# Patient Record
Sex: MALE | Race: WHITE | NOT HISPANIC OR LATINO | URBAN - METROPOLITAN AREA
[De-identification: names, ages, dates, MRNs, and addresses within clinical notes are randomized per-mention and may not be internally consistent; named-entity substitution may affect disease eponyms.]

---

## 2018-05-27 ENCOUNTER — INPATIENT (INPATIENT)
Facility: HOSPITAL | Age: 83
LOS: 1 days | Discharge: HOME | DRG: 305 | End: 2018-05-29
Attending: INTERNAL MEDICINE | Admitting: STUDENT IN AN ORGANIZED HEALTH CARE EDUCATION/TRAINING PROGRAM
Payer: MEDICARE

## 2018-05-27 VITALS
SYSTOLIC BLOOD PRESSURE: 174 MMHG | TEMPERATURE: 98 F | DIASTOLIC BLOOD PRESSURE: 76 MMHG | HEART RATE: 56 BPM | RESPIRATION RATE: 18 BRPM

## 2018-05-27 DIAGNOSIS — N40.1 BENIGN PROSTATIC HYPERPLASIA WITH LOWER URINARY TRACT SYMPTOMS: ICD-10-CM

## 2018-05-27 DIAGNOSIS — I10 ESSENTIAL (PRIMARY) HYPERTENSION: ICD-10-CM

## 2018-05-27 DIAGNOSIS — Z29.9 ENCOUNTER FOR PROPHYLACTIC MEASURES, UNSPECIFIED: ICD-10-CM

## 2018-05-27 DIAGNOSIS — E78.5 HYPERLIPIDEMIA, UNSPECIFIED: ICD-10-CM

## 2018-05-27 DIAGNOSIS — R26.81 UNSTEADINESS ON FEET: ICD-10-CM

## 2018-05-27 DIAGNOSIS — R42 DIZZINESS AND GIDDINESS: ICD-10-CM

## 2018-05-27 DIAGNOSIS — N17.9 ACUTE KIDNEY FAILURE, UNSPECIFIED: ICD-10-CM

## 2018-05-27 DIAGNOSIS — I16.0 HYPERTENSIVE URGENCY: ICD-10-CM

## 2018-05-27 LAB
ALBUMIN SERPL ELPH-MCNC: 3.8 G/DL — SIGNIFICANT CHANGE UP (ref 3.3–5)
ALP SERPL-CCNC: 111 U/L — SIGNIFICANT CHANGE UP (ref 40–120)
ALT FLD-CCNC: 30 U/L — SIGNIFICANT CHANGE UP (ref 12–78)
ANION GAP SERPL CALC-SCNC: 9 MMOL/L — SIGNIFICANT CHANGE UP (ref 5–17)
APPEARANCE UR: CLEAR — SIGNIFICANT CHANGE UP
AST SERPL-CCNC: 31 U/L — SIGNIFICANT CHANGE UP (ref 15–37)
BASOPHILS # BLD AUTO: 0.01 K/UL — SIGNIFICANT CHANGE UP (ref 0–0.2)
BASOPHILS NFR BLD AUTO: 0.2 % — SIGNIFICANT CHANGE UP (ref 0–2)
BILIRUB SERPL-MCNC: 0.9 MG/DL — SIGNIFICANT CHANGE UP (ref 0.2–1.2)
BILIRUB UR-MCNC: NEGATIVE — SIGNIFICANT CHANGE UP
BUN SERPL-MCNC: 29 MG/DL — HIGH (ref 7–23)
CALCIUM SERPL-MCNC: 8.7 MG/DL — SIGNIFICANT CHANGE UP (ref 8.5–10.1)
CHLORIDE SERPL-SCNC: 103 MMOL/L — SIGNIFICANT CHANGE UP (ref 96–108)
CO2 SERPL-SCNC: 27 MMOL/L — SIGNIFICANT CHANGE UP (ref 22–31)
COLOR SPEC: YELLOW — SIGNIFICANT CHANGE UP
CREAT SERPL-MCNC: 1.5 MG/DL — HIGH (ref 0.5–1.3)
DIFF PNL FLD: NEGATIVE — SIGNIFICANT CHANGE UP
EOSINOPHIL # BLD AUTO: 0.02 K/UL — SIGNIFICANT CHANGE UP (ref 0–0.5)
EOSINOPHIL NFR BLD AUTO: 0.3 % — SIGNIFICANT CHANGE UP (ref 0–6)
GLUCOSE SERPL-MCNC: 151 MG/DL — HIGH (ref 70–99)
GLUCOSE UR QL: NEGATIVE — SIGNIFICANT CHANGE UP
HCT VFR BLD CALC: 44.7 % — SIGNIFICANT CHANGE UP (ref 39–50)
HGB BLD-MCNC: 15.3 G/DL — SIGNIFICANT CHANGE UP (ref 13–17)
IMM GRANULOCYTES NFR BLD AUTO: 1 % — SIGNIFICANT CHANGE UP (ref 0–1.5)
KETONES UR-MCNC: NEGATIVE — SIGNIFICANT CHANGE UP
LEUKOCYTE ESTERASE UR-ACNC: NEGATIVE — SIGNIFICANT CHANGE UP
LYMPHOCYTES # BLD AUTO: 0.62 K/UL — LOW (ref 1–3.3)
LYMPHOCYTES # BLD AUTO: 10 % — LOW (ref 13–44)
MCHC RBC-ENTMCNC: 31.5 PG — SIGNIFICANT CHANGE UP (ref 27–34)
MCHC RBC-ENTMCNC: 34.2 GM/DL — SIGNIFICANT CHANGE UP (ref 32–36)
MCV RBC AUTO: 92.2 FL — SIGNIFICANT CHANGE UP (ref 80–100)
MONOCYTES # BLD AUTO: 0.41 K/UL — SIGNIFICANT CHANGE UP (ref 0–0.9)
MONOCYTES NFR BLD AUTO: 6.6 % — SIGNIFICANT CHANGE UP (ref 2–14)
NEUTROPHILS # BLD AUTO: 5.1 K/UL — SIGNIFICANT CHANGE UP (ref 1.8–7.4)
NEUTROPHILS NFR BLD AUTO: 81.9 % — HIGH (ref 43–77)
NITRITE UR-MCNC: NEGATIVE — SIGNIFICANT CHANGE UP
PH UR: 7 — SIGNIFICANT CHANGE UP (ref 5–8)
PLATELET # BLD AUTO: 160 K/UL — SIGNIFICANT CHANGE UP (ref 150–400)
POTASSIUM SERPL-MCNC: 4.6 MMOL/L — SIGNIFICANT CHANGE UP (ref 3.5–5.3)
POTASSIUM SERPL-SCNC: 4.6 MMOL/L — SIGNIFICANT CHANGE UP (ref 3.5–5.3)
PROT SERPL-MCNC: 7.8 G/DL — SIGNIFICANT CHANGE UP (ref 6–8.3)
PROT UR-MCNC: 25 MG/DL
RBC # BLD: 4.85 M/UL — SIGNIFICANT CHANGE UP (ref 4.2–5.8)
RBC # FLD: 12.6 % — SIGNIFICANT CHANGE UP (ref 10.3–14.5)
SODIUM SERPL-SCNC: 139 MMOL/L — SIGNIFICANT CHANGE UP (ref 135–145)
SP GR SPEC: 1 — LOW (ref 1.01–1.02)
TROPONIN I SERPL-MCNC: <.015 NG/ML — SIGNIFICANT CHANGE UP (ref 0.01–0.04)
UROBILINOGEN FLD QL: NEGATIVE — SIGNIFICANT CHANGE UP
WBC # BLD: 6.22 K/UL — SIGNIFICANT CHANGE UP (ref 3.8–10.5)
WBC # FLD AUTO: 6.22 K/UL — SIGNIFICANT CHANGE UP (ref 3.8–10.5)

## 2018-05-27 PROCEDURE — 71046 X-RAY EXAM CHEST 2 VIEWS: CPT | Mod: 26

## 2018-05-27 PROCEDURE — 93010 ELECTROCARDIOGRAM REPORT: CPT

## 2018-05-27 PROCEDURE — 74176 CT ABD & PELVIS W/O CONTRAST: CPT | Mod: 26

## 2018-05-27 PROCEDURE — 99285 EMERGENCY DEPT VISIT HI MDM: CPT

## 2018-05-27 PROCEDURE — 99223 1ST HOSP IP/OBS HIGH 75: CPT | Mod: AI

## 2018-05-27 PROCEDURE — 70450 CT HEAD/BRAIN W/O DYE: CPT | Mod: 26

## 2018-05-27 RX ORDER — ASPIRIN/CALCIUM CARB/MAGNESIUM 324 MG
81 TABLET ORAL DAILY
Qty: 0 | Refills: 0 | Status: DISCONTINUED | OUTPATIENT
Start: 2018-05-27 | End: 2018-05-29

## 2018-05-27 RX ORDER — HYDROCHLOROTHIAZIDE 25 MG
25 TABLET ORAL DAILY
Qty: 0 | Refills: 0 | Status: DISCONTINUED | OUTPATIENT
Start: 2018-05-27 | End: 2018-05-28

## 2018-05-27 RX ORDER — HEPARIN SODIUM 5000 [USP'U]/ML
5000 INJECTION INTRAVENOUS; SUBCUTANEOUS EVERY 8 HOURS
Qty: 0 | Refills: 0 | Status: DISCONTINUED | OUTPATIENT
Start: 2018-05-27 | End: 2018-05-29

## 2018-05-27 RX ORDER — MECLIZINE HCL 12.5 MG
25 TABLET ORAL THREE TIMES A DAY
Qty: 0 | Refills: 0 | Status: DISCONTINUED | OUTPATIENT
Start: 2018-05-27 | End: 2018-05-29

## 2018-05-27 RX ORDER — ONDANSETRON 8 MG/1
4 TABLET, FILM COATED ORAL ONCE
Qty: 0 | Refills: 0 | Status: COMPLETED | OUTPATIENT
Start: 2018-05-27 | End: 2018-05-27

## 2018-05-27 RX ORDER — ONDANSETRON 8 MG/1
4 TABLET, FILM COATED ORAL EVERY 6 HOURS
Qty: 0 | Refills: 0 | Status: DISCONTINUED | OUTPATIENT
Start: 2018-05-27 | End: 2018-05-29

## 2018-05-27 RX ORDER — ACETAMINOPHEN 500 MG
650 TABLET ORAL EVERY 6 HOURS
Qty: 0 | Refills: 0 | Status: DISCONTINUED | OUTPATIENT
Start: 2018-05-27 | End: 2018-05-29

## 2018-05-27 RX ORDER — FINASTERIDE 5 MG/1
1 TABLET, FILM COATED ORAL
Qty: 0 | Refills: 0 | COMMUNITY

## 2018-05-27 RX ORDER — ATORVASTATIN CALCIUM 80 MG/1
80 TABLET, FILM COATED ORAL AT BEDTIME
Qty: 0 | Refills: 0 | Status: DISCONTINUED | OUTPATIENT
Start: 2018-05-27 | End: 2018-05-29

## 2018-05-27 RX ORDER — MECLIZINE HCL 12.5 MG
25 TABLET ORAL ONCE
Qty: 0 | Refills: 0 | Status: COMPLETED | OUTPATIENT
Start: 2018-05-27 | End: 2018-05-27

## 2018-05-27 RX ORDER — FINASTERIDE 5 MG/1
5 TABLET, FILM COATED ORAL DAILY
Qty: 0 | Refills: 0 | Status: DISCONTINUED | OUTPATIENT
Start: 2018-05-27 | End: 2018-05-29

## 2018-05-27 RX ORDER — SODIUM CHLORIDE 9 MG/ML
1000 INJECTION INTRAMUSCULAR; INTRAVENOUS; SUBCUTANEOUS ONCE
Qty: 0 | Refills: 0 | Status: COMPLETED | OUTPATIENT
Start: 2018-05-27 | End: 2018-05-27

## 2018-05-27 RX ORDER — HYDRALAZINE HCL 50 MG
10 TABLET ORAL THREE TIMES A DAY
Qty: 0 | Refills: 0 | Status: DISCONTINUED | OUTPATIENT
Start: 2018-05-27 | End: 2018-05-29

## 2018-05-27 RX ORDER — DOXAZOSIN MESYLATE 4 MG
8 TABLET ORAL AT BEDTIME
Qty: 0 | Refills: 0 | Status: DISCONTINUED | OUTPATIENT
Start: 2018-05-27 | End: 2018-05-28

## 2018-05-27 RX ORDER — SODIUM CHLORIDE 9 MG/ML
1000 INJECTION INTRAMUSCULAR; INTRAVENOUS; SUBCUTANEOUS
Qty: 0 | Refills: 0 | Status: DISCONTINUED | OUTPATIENT
Start: 2018-05-27 | End: 2018-05-27

## 2018-05-27 RX ORDER — PANTOPRAZOLE SODIUM 20 MG/1
40 TABLET, DELAYED RELEASE ORAL
Qty: 0 | Refills: 0 | Status: DISCONTINUED | OUTPATIENT
Start: 2018-05-27 | End: 2018-05-29

## 2018-05-27 RX ORDER — HYDRALAZINE HCL 50 MG
10 TABLET ORAL ONCE
Qty: 0 | Refills: 0 | Status: COMPLETED | OUTPATIENT
Start: 2018-05-27 | End: 2018-05-27

## 2018-05-27 RX ORDER — AMLODIPINE BESYLATE 2.5 MG/1
5 TABLET ORAL DAILY
Qty: 0 | Refills: 0 | Status: DISCONTINUED | OUTPATIENT
Start: 2018-05-27 | End: 2018-05-28

## 2018-05-27 RX ORDER — MECLIZINE HCL 12.5 MG
1 TABLET ORAL
Qty: 30 | Refills: 0 | OUTPATIENT
Start: 2018-05-27

## 2018-05-27 RX ADMIN — SODIUM CHLORIDE 1000 MILLILITER(S): 9 INJECTION INTRAMUSCULAR; INTRAVENOUS; SUBCUTANEOUS at 16:00

## 2018-05-27 RX ADMIN — ONDANSETRON 4 MILLIGRAM(S): 8 TABLET, FILM COATED ORAL at 16:08

## 2018-05-27 RX ADMIN — Medication 10 MILLIGRAM(S): at 16:08

## 2018-05-27 RX ADMIN — Medication 25 MILLIGRAM(S): at 16:12

## 2018-05-27 RX ADMIN — AMLODIPINE BESYLATE 5 MILLIGRAM(S): 2.5 TABLET ORAL at 20:19

## 2018-05-27 NOTE — ED ADULT NURSE REASSESSMENT NOTE - NS ED NURSE REASSESS COMMENT FT1
patient reports feeling dizzy, nauseas with dry heaves, BP elevated, MD alonzo and primary RN Malik made aware. Will continue to monitor.

## 2018-05-27 NOTE — H&P ADULT - ATTENDING COMMENTS
The admission plan was discussed in detail with the patient and family members at the bedside. Their questions and concerns were addressed to the best of my ability. They are in agreement with the plan as detailed above. They demonstrated adequate understanding of the counseling which I have provided.      [full note to follow] The admission plan was discussed in detail with the patient and family members at the bedside. Their questions and concerns were addressed to the best of my ability. They are in agreement with the plan as detailed above. They demonstrated adequate understanding of the counseling which I have provided.

## 2018-05-27 NOTE — H&P ADULT - PROBLEM SELECTOR PLAN 3
likely vertigo as symptoms are positional and worse when standing from seated position  continue meclizine as above  check orthostatics  f/u neuro recs

## 2018-05-27 NOTE — H&P ADULT - NSHPSOCIALHISTORY_GEN_ALL_CORE
pt has at least 80 pack-year smokking history but quit 35yrs ago  no etoh or drug use  lives w/ spouse  ambulates independently at baseline

## 2018-05-27 NOTE — H&P ADULT - HISTORY OF PRESENT ILLNESS
86yo M w/ PMHx HTN, HLD, BPH, melanoma on RLE, [place] presents w/ intractable dizziness 86yo M w/ PMHx HTN, HLD, BPH, melanoma on RLE, [place] presents w/ intractable dizziness which was associated w/ elevated BP's today. The pt is [place] and offers minimal complaints upon questionng, majority if history obtained from pt's family (spouse/daughter) at the bedside. They state that they helped him check his BP's this morning and his BP was 220 systolic at home. He took his regular home meds but BP's didn't improve. They state that the color had drained from his face and he was complaining of feeling dizzy so they brought him to the ED. In the ED pt's BP's have been elevated throughout the day. He was given hydralazine and meclizine and apparently had multiple ambulation challenges but pt felt too weak, too dizzy to ambulate and family state that he has to climb 2 flights of stairs to the house so they can't take him home in his current state. Pt admits to feeling like the room is spinning but only when standing from a seated position and sitting from a lying position. He has no h/o similar complaint. Typical BP's in the office have been about 130/70 over the previous few years and he has had no adjustments to his meds. Denies HA. Denies blurred vision. Denies numbness/tinging in extremities. ADmits feeling generally weak.     In the ED, pt was given hydralazine and BP improved from 210 systolic into the 130's but BP's elevated again over the course of the afternoon. He was unable to ambulate due to dizziness.

## 2018-05-27 NOTE — H&P ADULT - PROBLEM SELECTOR PLAN 1
admit to medicine  multiple attempts to d/c the pt from the ED but pt is unable to ambulate due to persistent dizziness  hold losartan due to ? SHEREE ?   will continue w/ hctz, however  consider MR brain if symptoms do not improve. will need to eval for possible brain stem pathology. ?CVA, doubt this w/ benign neurologic examination at the time of admission. no h/o CVA.   will ask for neuro input (stephen)  continue w/ IVF tonight and meclizine tid   symptoms are improving by the time I examined him but not resolved  check orthostatics  PT consult  fall risk  ambulate w/ assistance  bed alarm  holding parameters for BP meds SBP<140

## 2018-05-27 NOTE — ED PROVIDER NOTE - OBJECTIVE STATEMENT
86 y/o M with hx of HTN, BPH with c/o weakness, lower abdominal discomfort and elevated BP today.  Pt states he had a "heated discussion" with his family about politics yesterday.  Pt woke up today feeling unsteady with lower abd discomfort.  BP was 200/100 and pt took all of his morning and night time medications.  Pt currently asymptomatic.

## 2018-05-27 NOTE — ED ADULT NURSE NOTE - CHPI ED SYMPTOMS NEG
no loss of consciousness/no numbness/no confusion/no weakness/no fever/no change in level of consciousness/no blurred vision

## 2018-05-27 NOTE — ED ADULT NURSE NOTE - OBJECTIVE STATEMENT
patient came in ED from home with c/o sudden onset of dizziness, nausea and vomiting with elevated BP X this morning. denies LOC. denies chest pain or discomfort. no facial droop noted. no arm drift. no slurred speech. afebrile. alert and oriented X 4. placed on cardiac monitor. on fall precaution. bedside blood glucose 149mg/dl taken and recorded. awaiting md evaluation.

## 2018-05-27 NOTE — H&P ADULT - NSHPPHYSICALEXAM_GEN_ALL_CORE
T(C): 36.4 (05-27-18 @ 20:11), Max: 36.7 (05-27-18 @ 13:40)  HR: 80 (05-27-18 @ 20:11) (56 - 81)  BP: 184/63 (05-27-18 @ 20:11) (166/74 - 211/70)  RR: 16 (05-27-18 @ 20:11) (16 - 20)  SpO2: 95% (05-27-18 @ 20:11) (95% - 100%)    GENERAL: patient appears well, no acute distress, appropriate, pleasant  EYES: sclera clear, no exudates  ENMT: oropharynx clear without erythema, no exudates, moist mucous membranes  NECK: supple, soft, no thyromegaly noted  LUNGS: good air entry bilaterally, clear to auscultation, symmetric breath sounds, no wheezing or rhonchi appreciated  HEART: soft S1/S2, regular rate and rhythm, no murmurs noted, no lower extremity edema  GASTROINTESTINAL: abdomen is soft, nontender, nondistended, normoactive bowel sounds, no palpable masses  INTEGUMENT: good skin turgor. multiple stuck on appearing skin lesions on his back (unchanged recently as per pt)  MUSCULOSKELETAL: no clubbing or cyanosis, no obvious deformity  NEUROLOGIC: awake, alert, oriented x3, good muscle tone in 4 extremities, no obvious sensory deficits  PSYCHIATRIC: mood is good, affect is congruent, linear and logical thought process  HEME/LYMPH: no palpable supraclavicular nodules, no obvious ecchymosis or petechiae

## 2018-05-27 NOTE — H&P ADULT - NSHPREVIEWOFSYSTEMS_GEN_ALL_CORE
CONSTITUTIONAL: denies fever, chills  HEENT: denies blurred vision, sore throat  SKIN: denies new lesions, rash  CARDIOVASCULAR: denies chest pain, chest pressure, palpitations  RESPIRATORY: denies shortness of breath, sputum production  GASTROINTESTINAL: denies nausea, vomiting, diarrhea, abdominal pain  GENITOURINARY: denies dysuria, discharge  NEUROLOGICAL: as per HPI  MUSCULOSKELETAL: denies new joint pain, muscle aches  HEMATOLOGIC: denies gross bleeding, bruising  LYMPHATICS: denies enlarged lymph nodes, extremity swelling  ENDOCRINOLOGIC: denies sweating, cold or heat intolerance

## 2018-05-28 LAB
ANION GAP SERPL CALC-SCNC: 8 MMOL/L — SIGNIFICANT CHANGE UP (ref 5–17)
BUN SERPL-MCNC: 26 MG/DL — HIGH (ref 7–23)
CALCIUM SERPL-MCNC: 9 MG/DL — SIGNIFICANT CHANGE UP (ref 8.5–10.1)
CHLORIDE SERPL-SCNC: 105 MMOL/L — SIGNIFICANT CHANGE UP (ref 96–108)
CHOLEST SERPL-MCNC: 133 MG/DL — SIGNIFICANT CHANGE UP (ref 10–199)
CO2 SERPL-SCNC: 28 MMOL/L — SIGNIFICANT CHANGE UP (ref 22–31)
CREAT SERPL-MCNC: 1.4 MG/DL — HIGH (ref 0.5–1.3)
GLUCOSE SERPL-MCNC: 77 MG/DL — SIGNIFICANT CHANGE UP (ref 70–99)
HBA1C BLD-MCNC: 5.5 % — SIGNIFICANT CHANGE UP (ref 4–5.6)
HCT VFR BLD CALC: 40 % — SIGNIFICANT CHANGE UP (ref 39–50)
HDLC SERPL-MCNC: 33 MG/DL — LOW (ref 40–125)
HGB BLD-MCNC: 14 G/DL — SIGNIFICANT CHANGE UP (ref 13–17)
LIPID PNL WITH DIRECT LDL SERPL: 76 MG/DL — SIGNIFICANT CHANGE UP
MAGNESIUM SERPL-MCNC: 2.1 MG/DL — SIGNIFICANT CHANGE UP (ref 1.6–2.6)
MCHC RBC-ENTMCNC: 32.1 PG — SIGNIFICANT CHANGE UP (ref 27–34)
MCHC RBC-ENTMCNC: 35 GM/DL — SIGNIFICANT CHANGE UP (ref 32–36)
MCV RBC AUTO: 91.7 FL — SIGNIFICANT CHANGE UP (ref 80–100)
NRBC # BLD: 0 /100 WBCS — SIGNIFICANT CHANGE UP (ref 0–0)
PLATELET # BLD AUTO: 172 K/UL — SIGNIFICANT CHANGE UP (ref 150–400)
POTASSIUM SERPL-MCNC: 3.5 MMOL/L — SIGNIFICANT CHANGE UP (ref 3.5–5.3)
POTASSIUM SERPL-SCNC: 3.5 MMOL/L — SIGNIFICANT CHANGE UP (ref 3.5–5.3)
RBC # BLD: 4.36 M/UL — SIGNIFICANT CHANGE UP (ref 4.2–5.8)
RBC # FLD: 12.7 % — SIGNIFICANT CHANGE UP (ref 10.3–14.5)
SODIUM SERPL-SCNC: 141 MMOL/L — SIGNIFICANT CHANGE UP (ref 135–145)
TOTAL CHOLESTEROL/HDL RATIO MEASUREMENT: 4 RATIO — SIGNIFICANT CHANGE UP (ref 3.4–9.6)
TRIGL SERPL-MCNC: 121 MG/DL — SIGNIFICANT CHANGE UP (ref 10–149)
TSH SERPL-MCNC: 2.6 UIU/ML — SIGNIFICANT CHANGE UP (ref 0.36–3.74)
VIT B12 SERPL-MCNC: 1637 PG/ML — HIGH (ref 232–1245)
WBC # BLD: 7.73 K/UL — SIGNIFICANT CHANGE UP (ref 3.8–10.5)
WBC # FLD AUTO: 7.73 K/UL — SIGNIFICANT CHANGE UP (ref 3.8–10.5)

## 2018-05-28 PROCEDURE — 93010 ELECTROCARDIOGRAM REPORT: CPT

## 2018-05-28 PROCEDURE — 99233 SBSQ HOSP IP/OBS HIGH 50: CPT

## 2018-05-28 RX ORDER — SODIUM CHLORIDE 9 MG/ML
1000 INJECTION INTRAMUSCULAR; INTRAVENOUS; SUBCUTANEOUS
Qty: 0 | Refills: 0 | Status: DISCONTINUED | OUTPATIENT
Start: 2018-05-28 | End: 2018-05-29

## 2018-05-28 RX ORDER — AMLODIPINE BESYLATE 2.5 MG/1
10 TABLET ORAL DAILY
Qty: 0 | Refills: 0 | Status: DISCONTINUED | OUTPATIENT
Start: 2018-05-28 | End: 2018-05-29

## 2018-05-28 RX ADMIN — Medication 10 MILLIGRAM(S): at 22:10

## 2018-05-28 RX ADMIN — HEPARIN SODIUM 5000 UNIT(S): 5000 INJECTION INTRAVENOUS; SUBCUTANEOUS at 08:02

## 2018-05-28 RX ADMIN — FINASTERIDE 5 MILLIGRAM(S): 5 TABLET, FILM COATED ORAL at 13:05

## 2018-05-28 RX ADMIN — HEPARIN SODIUM 5000 UNIT(S): 5000 INJECTION INTRAVENOUS; SUBCUTANEOUS at 00:07

## 2018-05-28 RX ADMIN — ATORVASTATIN CALCIUM 80 MILLIGRAM(S): 80 TABLET, FILM COATED ORAL at 22:10

## 2018-05-28 RX ADMIN — Medication 10 MILLIGRAM(S): at 13:05

## 2018-05-28 RX ADMIN — PANTOPRAZOLE SODIUM 40 MILLIGRAM(S): 20 TABLET, DELAYED RELEASE ORAL at 06:29

## 2018-05-28 RX ADMIN — Medication 1 TABLET(S): at 13:05

## 2018-05-28 RX ADMIN — HEPARIN SODIUM 5000 UNIT(S): 5000 INJECTION INTRAVENOUS; SUBCUTANEOUS at 19:12

## 2018-05-28 RX ADMIN — Medication 81 MILLIGRAM(S): at 13:05

## 2018-05-28 RX ADMIN — AMLODIPINE BESYLATE 10 MILLIGRAM(S): 2.5 TABLET ORAL at 13:05

## 2018-05-28 NOTE — PROGRESS NOTE ADULT - PROBLEM SELECTOR PLAN 1
CVA, doubt this w/ benign neurologic examination at the time of admission. no h/o CVA. neuro eval pending  BP is controlled on current meds, amlodipine and Hydralazine  continue w/ IVF tonight and meclizine tid   symptoms are improving by the time I examined him but not resolved  check orthostatics  PT consult  fall risk  ambulate w/ assistance  bed alarm  holding parameters for BP meds SBP<140

## 2018-05-28 NOTE — CONSULT NOTE ADULT - ASSESSMENT
dizziness suspect in context of htn   monitor sbp  check mri if unable to be done will have it done as outpt   spoke to family

## 2018-05-28 NOTE — PROGRESS NOTE ADULT - PROBLEM SELECTOR PROBLEM 4
LOV 7/15, pt states his wart to right thumb \"fluctuated in size\" post cryo but now it's about the same - should he come in for another cryo session or can we recc a topical med?  Please advise
Pt contacted, informed per [address] that it is only 11 days post cryotherapy, but can apply OTC salicylic acid to the base of the wart over the next few days, it that treatment is not effective, [facility] will send a Rx for Aldara (explained how vicky works with imm
Pt states last visit had an area frozen. States has not fallen off yet.  PLease call
Use otc salacylic acid--like compound w for next few days. Just 11 days post cryo. Can add aldara qhs to base of the wart.   Re-check in 1 month if still bothersome
Gait instability

## 2018-05-29 ENCOUNTER — TRANSCRIPTION ENCOUNTER (OUTPATIENT)
Age: 83
End: 2018-05-29

## 2018-05-29 VITALS
RESPIRATION RATE: 18 BRPM | DIASTOLIC BLOOD PRESSURE: 64 MMHG | HEART RATE: 79 BPM | SYSTOLIC BLOOD PRESSURE: 129 MMHG | OXYGEN SATURATION: 97 %

## 2018-05-29 LAB
ANION GAP SERPL CALC-SCNC: 8 MMOL/L — SIGNIFICANT CHANGE UP (ref 5–17)
BUN SERPL-MCNC: 29 MG/DL — HIGH (ref 7–23)
CALCIUM SERPL-MCNC: 8.8 MG/DL — SIGNIFICANT CHANGE UP (ref 8.5–10.1)
CHLORIDE SERPL-SCNC: 104 MMOL/L — SIGNIFICANT CHANGE UP (ref 96–108)
CO2 SERPL-SCNC: 29 MMOL/L — SIGNIFICANT CHANGE UP (ref 22–31)
CREAT SERPL-MCNC: 1.6 MG/DL — HIGH (ref 0.5–1.3)
GLUCOSE SERPL-MCNC: 82 MG/DL — SIGNIFICANT CHANGE UP (ref 70–99)
HCT VFR BLD CALC: 42.6 % — SIGNIFICANT CHANGE UP (ref 39–50)
HGB BLD-MCNC: 14.8 G/DL — SIGNIFICANT CHANGE UP (ref 13–17)
MCHC RBC-ENTMCNC: 31.8 PG — SIGNIFICANT CHANGE UP (ref 27–34)
MCHC RBC-ENTMCNC: 34.7 GM/DL — SIGNIFICANT CHANGE UP (ref 32–36)
MCV RBC AUTO: 91.6 FL — SIGNIFICANT CHANGE UP (ref 80–100)
NRBC # BLD: 0 /100 WBCS — SIGNIFICANT CHANGE UP (ref 0–0)
PLATELET # BLD AUTO: 168 K/UL — SIGNIFICANT CHANGE UP (ref 150–400)
POTASSIUM SERPL-MCNC: 3.5 MMOL/L — SIGNIFICANT CHANGE UP (ref 3.5–5.3)
POTASSIUM SERPL-SCNC: 3.5 MMOL/L — SIGNIFICANT CHANGE UP (ref 3.5–5.3)
RBC # BLD: 4.65 M/UL — SIGNIFICANT CHANGE UP (ref 4.2–5.8)
RBC # FLD: 12.9 % — SIGNIFICANT CHANGE UP (ref 10.3–14.5)
SODIUM SERPL-SCNC: 141 MMOL/L — SIGNIFICANT CHANGE UP (ref 135–145)
WBC # BLD: 6.73 K/UL — SIGNIFICANT CHANGE UP (ref 3.8–10.5)
WBC # FLD AUTO: 6.73 K/UL — SIGNIFICANT CHANGE UP (ref 3.8–10.5)

## 2018-05-29 PROCEDURE — 80061 LIPID PANEL: CPT

## 2018-05-29 PROCEDURE — 80048 BASIC METABOLIC PNL TOTAL CA: CPT

## 2018-05-29 PROCEDURE — 71046 X-RAY EXAM CHEST 2 VIEWS: CPT

## 2018-05-29 PROCEDURE — 81001 URINALYSIS AUTO W/SCOPE: CPT

## 2018-05-29 PROCEDURE — 85027 COMPLETE CBC AUTOMATED: CPT

## 2018-05-29 PROCEDURE — 82607 VITAMIN B-12: CPT

## 2018-05-29 PROCEDURE — 96374 THER/PROPH/DIAG INJ IV PUSH: CPT

## 2018-05-29 PROCEDURE — 83735 ASSAY OF MAGNESIUM: CPT

## 2018-05-29 PROCEDURE — 70551 MRI BRAIN STEM W/O DYE: CPT

## 2018-05-29 PROCEDURE — 70551 MRI BRAIN STEM W/O DYE: CPT | Mod: 26

## 2018-05-29 PROCEDURE — 97162 PT EVAL MOD COMPLEX 30 MIN: CPT

## 2018-05-29 PROCEDURE — 96372 THER/PROPH/DIAG INJ SC/IM: CPT | Mod: 59

## 2018-05-29 PROCEDURE — 84443 ASSAY THYROID STIM HORMONE: CPT

## 2018-05-29 PROCEDURE — 83036 HEMOGLOBIN GLYCOSYLATED A1C: CPT

## 2018-05-29 PROCEDURE — 70544 MR ANGIOGRAPHY HEAD W/O DYE: CPT

## 2018-05-29 PROCEDURE — 74176 CT ABD & PELVIS W/O CONTRAST: CPT

## 2018-05-29 PROCEDURE — 96376 TX/PRO/DX INJ SAME DRUG ADON: CPT

## 2018-05-29 PROCEDURE — 70450 CT HEAD/BRAIN W/O DYE: CPT

## 2018-05-29 PROCEDURE — 93005 ELECTROCARDIOGRAM TRACING: CPT

## 2018-05-29 PROCEDURE — 80053 COMPREHEN METABOLIC PANEL: CPT

## 2018-05-29 PROCEDURE — 84484 ASSAY OF TROPONIN QUANT: CPT

## 2018-05-29 PROCEDURE — 70544 MR ANGIOGRAPHY HEAD W/O DYE: CPT | Mod: 26,59

## 2018-05-29 PROCEDURE — 96375 TX/PRO/DX INJ NEW DRUG ADDON: CPT

## 2018-05-29 PROCEDURE — 99285 EMERGENCY DEPT VISIT HI MDM: CPT | Mod: 25

## 2018-05-29 PROCEDURE — 99239 HOSP IP/OBS DSCHRG MGMT >30: CPT

## 2018-05-29 PROCEDURE — 82962 GLUCOSE BLOOD TEST: CPT

## 2018-05-29 RX ORDER — HYDRALAZINE HCL 50 MG
1 TABLET ORAL
Qty: 90 | Refills: 0
Start: 2018-05-29 | End: 2018-06-27

## 2018-05-29 RX ORDER — HYDRALAZINE HCL 50 MG
1 TABLET ORAL
Qty: 0 | Refills: 0 | COMMUNITY
Start: 2018-05-29

## 2018-05-29 RX ORDER — ASPIRIN/CALCIUM CARB/MAGNESIUM 324 MG
1 TABLET ORAL
Qty: 0 | Refills: 0 | DISCHARGE
Start: 2018-05-29

## 2018-05-29 RX ORDER — MECLIZINE HCL 12.5 MG
1 TABLET ORAL
Qty: 30 | Refills: 0
Start: 2018-05-29 | End: 2018-06-07

## 2018-05-29 RX ORDER — HYDRALAZINE/HYDROCHLOROTHIAZID 50 MG-50MG
1 CAPSULE ORAL
Qty: 0 | Refills: 0 | COMMUNITY

## 2018-05-29 RX ORDER — LOSARTAN POTASSIUM 100 MG/1
1 TABLET, FILM COATED ORAL
Qty: 0 | Refills: 0 | COMMUNITY

## 2018-05-29 RX ORDER — DOXAZOSIN MESYLATE 4 MG
1 TABLET ORAL
Qty: 0 | Refills: 0 | COMMUNITY

## 2018-05-29 RX ORDER — MECLIZINE HCL 12.5 MG
1 TABLET ORAL
Qty: 0 | Refills: 0 | COMMUNITY
Start: 2018-05-29

## 2018-05-29 RX ORDER — AMLODIPINE BESYLATE 2.5 MG/1
1 TABLET ORAL
Qty: 0 | Refills: 0 | COMMUNITY
Start: 2018-05-29

## 2018-05-29 RX ORDER — AMLODIPINE BESYLATE 2.5 MG/1
1 TABLET ORAL
Qty: 30 | Refills: 0
Start: 2018-05-29 | End: 2018-06-27

## 2018-05-29 RX ADMIN — AMLODIPINE BESYLATE 10 MILLIGRAM(S): 2.5 TABLET ORAL at 06:09

## 2018-05-29 RX ADMIN — HEPARIN SODIUM 5000 UNIT(S): 5000 INJECTION INTRAVENOUS; SUBCUTANEOUS at 06:13

## 2018-05-29 RX ADMIN — PANTOPRAZOLE SODIUM 40 MILLIGRAM(S): 20 TABLET, DELAYED RELEASE ORAL at 06:09

## 2018-05-29 RX ADMIN — FINASTERIDE 5 MILLIGRAM(S): 5 TABLET, FILM COATED ORAL at 12:34

## 2018-05-29 RX ADMIN — Medication 10 MILLIGRAM(S): at 06:13

## 2018-05-29 RX ADMIN — Medication 0.5 MILLIGRAM(S): at 10:36

## 2018-05-29 RX ADMIN — Medication 1 TABLET(S): at 12:34

## 2018-05-29 RX ADMIN — Medication 10 MILLIGRAM(S): at 15:02

## 2018-05-29 RX ADMIN — Medication 81 MILLIGRAM(S): at 12:34

## 2018-05-29 NOTE — PHYSICAL THERAPY INITIAL EVALUATION ADULT - PERTINENT HX OF CURRENT PROBLEM, REHAB EVAL
86yo M presents w/ intractable dizziness  associated w/ elevated BP. ADmits feeling generally weak. Pt was unable to ambulate due to dizziness. CT(head) No acute intracranial hemorrhage or acute territorial infarct.

## 2018-05-29 NOTE — DISCHARGE NOTE ADULT - MEDICATION SUMMARY - MEDICATIONS TO TAKE
I will START or STAY ON the medications listed below when I get home from the hospital:    finasteride 5 mg oral tablet  -- 1 tab(s) by mouth once a day  -- Indication: For Benign prostatic hyperplasia with lower urinary tract symptoms, symptom details unspecified    aspirin 81 mg oral delayed release tablet  -- 1 tab(s) by mouth once a day  -- Indication: For Prophylactic measure    meclizine 25 mg oral tablet  -- 1 tab(s) by mouth 3 times a day, As needed, Dizziness  -- Indication: For Dizziness and giddiness    pravastatin 40 mg oral tablet  -- 1 tab(s) by mouth once a day  -- Indication: For Dyslipidemia    amLODIPine 10 mg oral tablet  -- 1 tab(s) by mouth once a day  -- Indication: For Essential hypertension    pantoprazole 40 mg oral delayed release tablet  -- 1 tab(s) by mouth once a day  -- Indication: For GERD    hydrALAZINE 10 mg oral tablet  -- 1 tab(s) by mouth 3 times a day  -- Indication: For Essential hypertension    Multiple Vitamins oral tablet  -- 1 tab(s) by mouth once a day  -- Indication: For Prophylactic measure

## 2018-05-29 NOTE — DISCHARGE NOTE ADULT - PATIENT PORTAL LINK FT
You can access the [facility] Patient Portal, offered by [facility], by registering with the following website: [email or web address]

## 2018-05-29 NOTE — DISCHARGE NOTE ADULT - HOSPITAL COURSE
88yo M w/ PMHx HTN, HLD, BPH, melanoma on RLE, [place] presents w/ intractable dizziness which was associated w/ elevated BP's today. The pt is [place] and offers minimal complaints upon questionng, majority if history obtained from pt's family (spouse/daughter) at the bedside. They state that they helped him check his BP's this morning and his BP was 220 systolic at home. He took his regular home meds but BP's didn't improve. They state that the color had drained from his face and he was complaining of feeling dizzy so they brought him to the ED. In the ED pt's BP's have been elevated throughout the day. He was given hydralazine and meclizine and apparently had multiple ambulation challenges but pt felt too weak, too dizzy to ambulate and family state that he has to climb 2 flights of stairs to the house so they can't take him home in his current state. Pt admits to feeling like the room is spinning but only when standing from a seated position and sitting from a lying position. He has no h/o similar complaint. Typical BP's in the office have been about 130/70 over the previous few years and he has had no adjustments to his meds. Denies HA. Denies blurred vision. Denies numbness/tinging in extremities. ADmits feeling generally weak.     In the ED, pt was given hydralazine and BP improved from 210 systolic into the 130's but BP's elevated again over the course of the afternoon. He was unable to ambulate due to dizziness.     Patient admitted to [facility] w/ hypertensive urgency.  Patient was found to have SHEREE on CKD, and was taken off of HCTZ and Losartan.  Given HTN Amlodipine was increased to 10mg, and patient was started on hydralazine q8 for SBP >170. Patient BP responded appropriately and remained controlled. Neurology Dr. Paulino was consulted given patient dizziness.  MRI w/ and w/o contrast performed to r/o acute intracranial pathology, both of which were unremarkable.  PT was consulted given dizziness and gait instability on admission, and found patient safe to discharge home w/ no physical therapy needs.  Patient dizziness resolved.    Patient seen and examined on day of discharge w/ no complaints.    REVIEW OF SYSTEMS:  CONSTITUTIONAL: No fever, No chills,No fatigue,No myalgia,No Body ache  EYES: No eye pain, visual disturbances, or discharge  ENMT:  No ear pain, No nose bleed, No vertigo; No sinus or throat pain  NECK: No pain, No stiffness  RESPIRATORY: No cough, wheezing, No  hemoptysis; No shortness of breath  CARDIOVASCULAR: No chest pain, palpitations, leg swelling  GASTROINTESTINAL: No abdominal or epigastric pain. No nausea, No vomiting; No diarrhea or constipation. [ ] BM  GENITOURINARY: No dysuria, No frequency, No urgency, No hematuria, or incontinence  NEUROLOGICAL: alert and oriented x 3,  No headaches, No dizziness, No numbness,  SKIN:   No itching, burning, rashes, or lesions   MUSCULOSKELETAL: No joint pain or swelling; No muscle pain, No back pain, No extremity pain  PSYCHIATRIC: No depression, anxiety, mood swings, or difficulty sleeping    Height (cm): 162.56 (05-27 @ 23:00)  Weight (kg): 76.1 (05-27 @ 23:00)  BMI (kg/m2): 28.8 (05-27 @ 23:00)  BSA (m2): 1.82 (05-27 @ 23:00)  Vital Signs Last 24 Hrs  T(C): 36.3 (29 May 2018 05:50), Max: 36.6 (28 May 2018 20:07)  T(F): 97.3 (29 May 2018 05:50), Max: 97.9 (28 May 2018 20:07)  HR: 82 (29 May 2018 05:50) (75 - 82)  BP: 128/78 (29 May 2018 05:50) (128/78 - 172/65)  RR: 18 (29 May 2018 05:50) (18 - 18)  SpO2: 96% (29 May 2018 05:50) (95% - 96%)  [x ] room air    Physical Exam:  General: Well developed, well nourished, No Acute Distress  HEENT: Normocephallic Atraumatic, PERRLA, EOMI bl, moist mucous membranes   Neck: Supple, nontender, no mass  Neurology: AA&Ox3, CN II-XII grossly intact, sensation intact  Respiratory: Clear To Auscultation B/L, No Wheezes, rhonchi or rales  CV: Regular Rate and Rhythm, +S1/S2, no murmurs, rubs or gallops  Abdominal: Soft, Non-Tender, Non-Distended +Bowel Soundsx4  Extremities: No Clubbing, cyanosis or edema, + peripheral pulses  Musculoskeletal: Normal Range of motion, no joint erythema or warmth, no joint swelling   Skin: warm, dry, normal color, no rash or abnormal lesions    Patient was deemed stable for discharge w/ closely monitored outpatient followup for medication management of hypertension and resolution of SHEREE on CKD. 88yo M w/ PMHx HTN, HLD, BPH, melanoma on RLE, [place] presents w/ intractable dizziness which was associated w/ elevated BP's today. The pt is [place] and offers minimal complaints upon questionng, majority if history obtained from pt's family (spouse/daughter) at the bedside. They state that they helped him check his BP's this morning and his BP was 220 systolic at home. He took his regular home meds but BP's didn't improve. They state that the color had drained from his face and he was complaining of feeling dizzy so they brought him to the ED. In the ED pt's BP's have been elevated throughout the day. He was given hydralazine and meclizine and apparently had multiple ambulation challenges but pt felt too weak, too dizzy to ambulate and family state that he has to climb 2 flights of stairs to the house so they can't take him home in his current state. Pt admits to feeling like the room is spinning but only when standing from a seated position and sitting from a lying position. He has no h/o similar complaint. Typical BP's in the office have been about 130/70 over the previous few years and he has had no adjustments to his meds. Denies HA. Denies blurred vision. Denies numbness/tinging in extremities. ADmits feeling generally weak.     In the ED, pt was given hydralazine and BP improved from 210 systolic into the 130's but BP's elevated again over the course of the afternoon. He was unable to ambulate due to dizziness.     Patient admitted to [facility] w/ hypertensive urgency.  Patient was found to have SHEREE on CKD, and was taken off of HCTZ, and Losartan.  Given HTN Amlodipine was increased to 10mg, and patient was started on hydralazine q8 for SBP >170.  Doxazosin was d/shannon for dizziness.  Patient BP responded appropriately and remained controlled. Neurology Dr. Paulino was consulted given patient dizziness.  MRI w/ and w/o contrast performed to r/o acute intracranial pathology, both of which were unremarkable.  PT was consulted given dizziness and gait instability on admission, and found patient safe to discharge home w/ no physical therapy needs.  Patient dizziness resolved.    Patient seen and examined on day of discharge w/ no complaints.    REVIEW OF SYSTEMS:  CONSTITUTIONAL: No fever, No chills,No fatigue,No myalgia,No Body ache  EYES: No eye pain, visual disturbances, or discharge  ENMT:  No ear pain, No nose bleed, No vertigo; No sinus or throat pain  NECK: No pain, No stiffness  RESPIRATORY: No cough, wheezing, No  hemoptysis; No shortness of breath  CARDIOVASCULAR: No chest pain, palpitations, leg swelling  GASTROINTESTINAL: No abdominal or epigastric pain. No nausea, No vomiting; No diarrhea or constipation. [ ] BM  GENITOURINARY: No dysuria, No frequency, No urgency, No hematuria, or incontinence  NEUROLOGICAL: alert and oriented x 3,  No headaches, No dizziness, No numbness,  SKIN:   No itching, burning, rashes, or lesions   MUSCULOSKELETAL: No joint pain or swelling; No muscle pain, No back pain, No extremity pain  PSYCHIATRIC: No depression, anxiety, mood swings, or difficulty sleeping    Height (cm): 162.56 (05-27 @ 23:00)  Weight (kg): 76.1 (05-27 @ 23:00)  BMI (kg/m2): 28.8 (05-27 @ 23:00)  BSA (m2): 1.82 (05-27 @ 23:00)  Vital Signs Last 24 Hrs  T(C): 36.3 (29 May 2018 05:50), Max: 36.6 (28 May 2018 20:07)  T(F): 97.3 (29 May 2018 05:50), Max: 97.9 (28 May 2018 20:07)  HR: 82 (29 May 2018 05:50) (75 - 82)  BP: 128/78 (29 May 2018 05:50) (128/78 - 172/65)  RR: 18 (29 May 2018 05:50) (18 - 18)  SpO2: 96% (29 May 2018 05:50) (95% - 96%)  [x ] room air    Physical Exam:  General: Well developed, well nourished, No Acute Distress  HEENT: Normocephallic Atraumatic, PERRLA, EOMI bl, moist mucous membranes   Neck: Supple, nontender, no mass  Neurology: AA&Ox3, CN II-XII grossly intact, sensation intact  Respiratory: Clear To Auscultation B/L, No Wheezes, rhonchi or rales  CV: Regular Rate and Rhythm, +S1/S2, no murmurs, rubs or gallops  Abdominal: Soft, Non-Tender, Non-Distended +Bowel Soundsx4  Extremities: No Clubbing, cyanosis or edema, + peripheral pulses  Musculoskeletal: Normal Range of motion, no joint erythema or warmth, no joint swelling   Skin: warm, dry, normal color, no rash or abnormal lesions    Patient was deemed stable for discharge w/ closely monitored outpatient followup for medication management of hypertension and resolution of SHEREE on CKD.    Dr. Salazar was called, is away until 6/4/18. left message.

## 2018-05-29 NOTE — PROGRESS NOTE ADULT - SUBJECTIVE AND OBJECTIVE BOX
Neurology follow up note    STEPHANIE SPENCE87yMale      Interval History:    Patient feels ok no new complaints.    MEDICATIONS    acetaminophen   Tablet. 650 milliGRAM(s) Oral every 6 hours PRN  amLODIPine   Tablet 10 milliGRAM(s) Oral daily  aspirin enteric coated 81 milliGRAM(s) Oral daily  atorvastatin 80 milliGRAM(s) Oral at bedtime  finasteride 5 milliGRAM(s) Oral daily  heparin  Injectable 5000 Unit(s) SubCutaneous every 8 hours  hydrALAZINE 10 milliGRAM(s) Oral three times a day  meclizine 25 milliGRAM(s) Oral three times a day PRN  multivitamin 1 Tablet(s) Oral daily  ondansetron Injectable 4 milliGRAM(s) IV Push every 6 hours PRN  pantoprazole    Tablet 40 milliGRAM(s) Oral before breakfast  sodium chloride 0.9%. 1000 milliLiter(s) IV Continuous <Continuous>      Allergies    No Known Allergies    Intolerances            Vital Signs Last 24 Hrs  T(C): 36.3 (29 May 2018 05:50), Max: 36.6 (28 May 2018 20:07)  T(F): 97.3 (29 May 2018 05:50), Max: 97.9 (28 May 2018 20:07)  HR: 82 (29 May 2018 05:50) (75 - 82)  BP: 128/78 (29 May 2018 05:50) (128/78 - 172/65)  BP(mean): --  RR: 18 (29 May 2018 05:50) (18 - 18)  SpO2: 96% (29 May 2018 05:50) (95% - 96%)      REVIEW OF SYSTEMS:    Constitutional: No fever, chills, fatigue, weakness  Eyes: no eye pain, visual disturbances, or discharge  ENT:  No difficulty hearing, tinnitus, vertigo; No sinus or throat pain  Neck: No pain or stiffness  Respiratory: No cough, dyspnea, wheezing   Cardiovascular: No chest pain, palpitations,   Gastrointestinal: No abdominal or epigastric pain. No nausea, vomiting  No diarrhea or constipation.   Genitourinary: No dysuria, frequency, hematuria or incontinence  Neurological: No headaches, lightheadedness, vertigo, numbness or tremors  Psychiatric: No depression, anxiety, mood swings or difficulty sleeping  Musculoskeletal: No joint pain or swelling; No muscle, back or extremity pain  Skin: No itching, burning, rashes or lesions   Lymph Nodes: No enlarged glands  Endocrine: No heat or cold intolerance; No hair loss   Allergy and Immunologic: No hives or eczema    On Neurological Examination: The patient is awake, alert, and oriented x3.      Extraocular movements were intact.      Pupils equal, round, and reactive bilaterally, 3 mm to 2 mm.      Speech was fluent.  Smile was symmetric.      Motor, bilateral upper and lower 5/5.      Sensory, bilateral upper and lower intact to light touch.      No drift, finger-to-nose within normal limits.      Gait, no ataxia was noted.      Follow simple commands  Follow complex commands    GENERAL Exam: Nontoxic , No Acute Distress   	  HEENT:  normocephalic, atraumatic  		  LUNGS: Clear bilaterally     HEART: Normal S1S2   No murmur RRR        	  GI/ ABDOMEN:  Soft  Non tender    EXTREMITIES:   No Edema  No Clubbing  No Cyanosis No Edema    MUSCULOSKELETAL: Normal Range of Motion   	   SKIN: Normal  No Ecchymosis               LABS:  CBC Full  -  ( 29 May 2018 07:08 )  WBC Count : 6.73 K/uL  Hemoglobin : 14.8 g/dL  Hematocrit : 42.6 %  Platelet Count - Automated : 168 K/uL  Mean Cell Volume : 91.6 fl  Mean Cell Hemoglobin : 31.8 pg  Mean Cell Hemoglobin Concentration : 34.7 gm/dL  Auto Neutrophil # : x  Auto Lymphocyte # : x  Auto Monocyte # : x  Auto Eosinophil # : x  Auto Basophil # : x  Auto Neutrophil % : x  Auto Lymphocyte % : x  Auto Monocyte % : x  Auto Eosinophil % : x  Auto Basophil % : x    Urinalysis Basic - ( 27 May 2018 16:23 )    Color: Yellow / Appearance: Clear / S.005 / pH: x  Gluc: x / Ketone: Negative  / Bili: Negative / Urobili: Negative   Blood: x / Protein: 25 mg/dL / Nitrite: Negative   Leuk Esterase: Negative / RBC: Negative /HPF / WBC 0-2   Sq Epi: x / Non Sq Epi: Negative / Bacteria: Negative          141  |  104  |  29<H>  ----------------------------<  82  3.5   |  29  |  1.60<H>    Ca    8.8      29 May 2018 07:08  Mg     2.1     -    TPro  7.8  /  Alb  3.8  /  TBili  0.9  /  DBili  x   /  AST  31  /  ALT  30  /  AlkPhos  111      Hemoglobin A1C:     LIVER FUNCTIONS - ( 27 May 2018 14:56 )  Alb: 3.8 g/dL / Pro: 7.8 g/dL / ALK PHOS: 111 U/L / ALT: 30 U/L / AST: 31 U/L / GGT: x           Vitamin B12         RADIOLOGY    ANALYSIS AND PLAN:  This is an 87-year-old with a history of dizziness, episode of ataxia and paraesthesia.  1.	For episode of dizziness in regard to lightheadedness, most likely secondary to hypertensive urgency.  As per my conversation with the patient and the family, they noticed that when the blood pressure started coming down, symptoms improved.  The patient's neurology exam is nonfocal.  2.	I will recommend to monitor systolic blood pressure.  3.	Adjustment of blood pressure medications as needed.  4.	For episode of ataxia, it appears to be in the context of dizziness.  At present, the patient is able to ambulate well with me up and down the espinal but he does not have any dizziness.  5.	For history of neuropathy, the patient is being evaluated by Dr. Keyes on the outside.  He will undergo nerve conduction study.  6.	 he will follow Dr. Keyes on the outside.  7.	I spoke with multiple family friends at bedside.  8.	mri head was normal   9.	His spouse name is Italo, her telephone number is [phone].    Neurologic standpoint only cleared for discharge planning     Physical therapy evaluation as tolerated  OOB to chair/ambulation with assistance only if possible.    Greater than 45 minutes spent in direct patient care reviewing  the notes, lab data/ imaging , discussion with multidisciplinary team.
Patient is a 87y old  Male PMHx HTN, HLD, BPH, melanoma on RLE, [place] presents w/ intractable dizziness and HTN urgency      INTERVAL HPI: Pt seen and examined bedside, has no complaints. feels better, no headache, dizziness.   OVERNIGHT EVENTS:  T(F): 98.3 (18 @ 05:46), Max: 98.3 (18 @ 05:46)  HR: 58 (18 @ 05:46) (56 - 81)  BP: 118/55 (18 @ 05:46) (118/55 - 211/70)  RR: 16 (18 @ 05:46) (16 - 20)  SpO2: 97% (18 @ 05:46) (95% - 100%)  Wt(kg): --  I&O's Summary    28 May 2018 07:01  -  28 May 2018 13:24  INTERVAL HPI: Pt seen and examined bedside, has no complaints.-------------------------------------   IN: 360 mL / OUT: 400 mL / NET: -40 mL        REVIEW OF SYSTEM:    Constitutional: No fever, chills, fatigue  Neuro: No headache, numbness, weakness  Resp: No cough, wheezing, shortness of breath  CVS: No chest pain, palpitations, leg swelling  GI: No abdominal pain, nausea, vomiting, diarrhea   : No dysuria, frequency, incontinence  Skin: No itching, burning, rashes, or lesions   Msk: No joint pain or swelling  Psych: No depression, anxiety, mood swings          PHYSICAL EXAM:  GENERAL: NAD, well-developed  HEAD:  Atraumatic, Normocephalic  EYES: EOMI, PERRLA, conjunctiva and sclera clear  ENMT: No tonsillar erythema, exudates, or enlargement; Moist mucous membranes,   NECK: Supple, No JVD, Normal thyroid  HEART: Regular rate and rhythm; No murmurs, rubs, or gallops  RESPIRATORY: CTA B/L, No wheezing / rhonchi  ABDOMEN: Soft, Nontender, Nondistended; Bowel sounds present  NEUROLOGY: A&Ox3, nonfocal, moving all extremities.  EXTREMITIES:  2+ Peripheral Pulses, No clubbing, cyanosis, or edema  SKIN: warm, dry, normal color, no rash or abnormal lesions        LABS:                        14.0   7.73  )-----------( 172      ( 28 May 2018 07:05 )             40.0         141  |  105  |  26<H>  ----------------------------<  77  3.5   |  28  |  1.40<H>    Ca    9.0      28 May 2018 07:05  Mg     2.1         TPro  7.8  /  Alb  3.8  /  TBili  0.9  /  DBili  x   /  AST  31  /  ALT  30  /  AlkPhos  111        Urinalysis Basic - ( 27 May 2018 16:23 )    Color: Yellow / Appearance: Clear / S.005 / pH: x  Gluc: x / Ketone: Negative  / Bili: Negative / Urobili: Negative   Blood: x / Protein: 25 mg/dL / Nitrite: Negative   Leuk Esterase: Negative / RBC: Negative /HPF / WBC 0-2   Sq Epi: x / Non Sq Epi: Negative / Bacteria: Negative      CAPILLARY BLOOD GLUCOSE      POCT Blood Glucose.: 149 mg/dL (27 May 2018 14:03)              MEDICATIONS  (STANDING):  amLODIPine   Tablet 10 milliGRAM(s) Oral daily  aspirin enteric coated 81 milliGRAM(s) Oral daily  atorvastatin 80 milliGRAM(s) Oral at bedtime  finasteride 5 milliGRAM(s) Oral daily  heparin  Injectable 5000 Unit(s) SubCutaneous every 8 hours  hydrALAZINE 10 milliGRAM(s) Oral three times a day  multivitamin 1 Tablet(s) Oral daily  pantoprazole    Tablet 40 milliGRAM(s) Oral before breakfast    MEDICATIONS  (PRN):  acetaminophen   Tablet. 650 milliGRAM(s) Oral every 6 hours PRN Mild Pain (1 - 3)  meclizine 25 milliGRAM(s) Oral three times a day PRN Dizziness  ondansetron Injectable 4 milliGRAM(s) IV Push every 6 hours PRN Nausea

## 2018-05-29 NOTE — DISCHARGE NOTE ADULT - MEDICATION SUMMARY - MEDICATIONS TO STOP TAKING
I will STOP taking the medications listed below when I get home from the hospital:    losartan 100 mg oral tablet  -- 1 tab(s) by mouth once a day    doxazosin 8 mg oral tablet  -- 1 tab(s) by mouth once a day    finasteride 1 mg oral tablet  -- 1 tab(s) by mouth once a day    meclizine 25 mg oral tablet  -- 1 tab(s) by mouth 3 times a day   -- May cause drowsiness.  Alcohol may intensify this effect.  Use care when operating dangerous machinery.

## 2018-05-29 NOTE — DISCHARGE NOTE ADULT - CARE PLAN
Principal Discharge DX:	Hypertensive urgency  Goal:	control of high blood pressure  Assessment and plan of treatment:	- While in the hospital you were found to have elevated blood pressure, which may have been the cause of your dizziness.  Your dosage of amlodipine was increased in the hospital from 5mg to 10mg.  Please continue taking the new higher dosage.   - Please follow up with your primary care doctor, Dr. Dawkins, w/in 2-3 days of discharge.  Secondary Diagnosis:	Essential hypertension  Assessment and plan of treatment:	- as above.  Secondary Diagnosis:	Hyperlipidemia, unspecified hyperlipidemia type  Assessment and plan of treatment:	- please continue home dosage of lipitor as directed  Secondary Diagnosis:	Benign prostatic hyperplasia with lower urinary tract symptoms, symptom details unspecified  Assessment and plan of treatment:	- while hospitalized you were taken off of doxazosin because it can cause dizziness.  You have been continued on finasteride for now.   - Please follow up with your urologist within one week of discharge for further management.  Secondary Diagnosis:	Acute kidney injury superimposed on chronic kidney disease  Assessment and plan of treatment:	- while hospitalized you were found to have some evidence of kidney damage, that can be caused by two of the high blood pressure medications you were taking.  For that reason please stop taking Losartan and Hydrochlorothiazide.  - Please follow up with your primary care doctor within 2-3 days of discharge for a repeat Basic Metabolic Panel to assess your kidney function, and ensure that it continues to return to normal. Principal Discharge DX:	Hypertensive urgency  Goal:	control of high blood pressure  Assessment and plan of treatment:	- While in the hospital you were found to have elevated blood pressure, which may have been the cause of your dizziness.  Your dosage of amlodipine was increased in the hospital from 5mg to 10mg.  Please continue taking the new higher dosage.   - Please follow up with your primary care doctor, Dr. Dawkins, w/in 2-3 days of discharge.  Secondary Diagnosis:	Essential hypertension  Assessment and plan of treatment:	- as above.  Secondary Diagnosis:	Hyperlipidemia, unspecified hyperlipidemia type  Assessment and plan of treatment:	- please continue home dosage of lipitor as directed  Secondary Diagnosis:	Benign prostatic hyperplasia with lower urinary tract symptoms, symptom details unspecified  Assessment and plan of treatment:	- while hospitalized you were taken off of doxazosin because it can cause dizziness.  You have been continued on finasteride for now.   - Please follow up with your urologist within one week of discharge for further management.  Secondary Diagnosis:	Acute kidney injury superimposed on chronic kidney disease  Assessment and plan of treatment:	- while hospitalized you were found to have some evidence of kidney damage, that can be caused by two of the high blood pressure medications you were taking.  For that reason please stop taking Losartan and Hydrochlorothiazide.  Follow with Renal  - Please follow up with your primary care doctor within 2-3 days of discharge for a repeat Basic Metabolic Panel to assess your kidney function, and ensure that it continues to return to normal.

## 2018-05-29 NOTE — DISCHARGE NOTE ADULT - CONDITIONS AT DISCHARGE
Patient left the unit in stable and ambulatory condition with no signs of acute distress noted. Patient was accompanied by spouse upon discharge. IV saline lock was removed with no noted complications. Discharge planning and instructions were provided in patients written and spoken language.

## 2018-05-29 NOTE — DISCHARGE NOTE ADULT - PLAN OF CARE
control of high blood pressure - While in the hospital you were found to have elevated blood pressure, which may have been the cause of your dizziness.  Your dosage of amlodipine was increased in the hospital from 5mg to 10mg.  Please continue taking the new higher dosage.   - Please follow up with your primary care doctor, Dr. Dawkins w/in 2-3 days of discharge. - as above. - please continue home dosage of lipitor as directed - while hospitalized you were taken off of doxazosin because it can cause dizziness.  You have been continued on finasteride for now.   - Please follow up with your urologist within one week of discharge for further management. - while hospitalized you were found to have some evidence of kidney damage, that can be caused by two of the high blood pressure medications you were taking.  For that reason please stop taking Losartan and Hydrochlorothiazide.  - Please follow up with your primary care doctor within 2-3 days of discharge for a repeat Basic Metabolic Panel to assess your kidney function, and ensure that it continues to return to normal. - while hospitalized you were found to have some evidence of kidney damage, that can be caused by two of the high blood pressure medications you were taking.  For that reason please stop taking Losartan and Hydrochlorothiazide.  Follow with Renal  - Please follow up with your primary care doctor within 2-3 days of discharge for a repeat Basic Metabolic Panel to assess your kidney function, and ensure that it continues to return to normal.

## 2018-05-29 NOTE — DISCHARGE NOTE ADULT - SECONDARY DIAGNOSIS.
Essential hypertension Hyperlipidemia, unspecified hyperlipidemia type Benign prostatic hyperplasia with lower urinary tract symptoms, symptom details unspecified Acute kidney injury superimposed on chronic kidney disease

## 2018-07-16 PROBLEM — I10 ESSENTIAL (PRIMARY) HYPERTENSION: Chronic | Status: ACTIVE | Noted: 2018-05-27

## 2018-07-16 PROBLEM — E78.5 HYPERLIPIDEMIA, UNSPECIFIED: Chronic | Status: ACTIVE | Noted: 2018-05-27

## 2018-07-16 PROBLEM — N40.0 BENIGN PROSTATIC HYPERPLASIA WITHOUT LOWER URINARY TRACT SYMPTOMS: Chronic | Status: ACTIVE | Noted: 2018-05-27

## 2018-07-16 PROBLEM — Z00.00 ENCOUNTER FOR PREVENTIVE HEALTH EXAMINATION: Status: ACTIVE | Noted: 2018-07-16

## 2018-08-02 ENCOUNTER — APPOINTMENT (OUTPATIENT)
Dept: ORTHOPEDIC SURGERY | Facility: CLINIC | Age: 83
End: 2018-08-02
Payer: MEDICARE

## 2018-08-02 VITALS
SYSTOLIC BLOOD PRESSURE: 145 MMHG | DIASTOLIC BLOOD PRESSURE: 66 MMHG | WEIGHT: 168 LBS | BODY MASS INDEX: 28.68 KG/M2 | HEART RATE: 62 BPM | HEIGHT: 64 IN

## 2018-08-02 DIAGNOSIS — M25.731 OSTEOPHYTE, RIGHT WRIST: ICD-10-CM

## 2018-08-02 DIAGNOSIS — M19.039 PRIMARY OSTEOARTHRITIS, UNSPECIFIED WRIST: ICD-10-CM

## 2018-08-02 DIAGNOSIS — M25.531 PAIN IN RIGHT WRIST: ICD-10-CM

## 2018-08-02 PROCEDURE — 99203 OFFICE O/P NEW LOW 30 MIN: CPT

## 2018-08-02 PROCEDURE — 73110 X-RAY EXAM OF WRIST: CPT | Mod: RT

## 2018-08-17 NOTE — ED ADULT NURSE NOTE - WITNESSED BY
FANY on CKD stage 3   Likely pre-renal from dehydration and hypotension   c/w IVF  Cr improving   f/u bmp spouse

## 2018-10-10 NOTE — PROCEDURE NOTE: SURGICAL
<p>Prior to commencing surgery patient identification, surgical procedure, site, and side were confirmed by Dr. Vanessa Stephens. Following topical proparacaine anesthesia, the patient was positioned at the YAG laser, a contact lens coupled to the cornea with methylcellulose and an axial posterior capsulotomy performed without complication using 3.0 Mj x 23. Excess methylcellulose was washed from the eye, one drop of Alphagan was instilled and the patient returned to the holding area having tolerated the procedure well and without complication. </p><p>MRN:[id number]</p>

## 2019-03-04 PROBLEM — M19.039 ARTHRITIS OF SCAPHO-TRAPEZIUM-TRAPEZOID JOINT: Status: ACTIVE | Noted: 2018-08-02

## 2019-05-16 ENCOUNTER — INPATIENT (INPATIENT)
Facility: HOSPITAL | Age: 84
LOS: 0 days | Discharge: HOME | DRG: 866 | End: 2019-05-17
Attending: HOSPITALIST | Admitting: HOSPITALIST
Payer: COMMERCIAL

## 2019-05-16 VITALS
TEMPERATURE: 98 F | SYSTOLIC BLOOD PRESSURE: 164 MMHG | WEIGHT: 169.09 LBS | RESPIRATION RATE: 16 BRPM | HEART RATE: 68 BPM | OXYGEN SATURATION: 100 % | HEIGHT: 64 IN | DIASTOLIC BLOOD PRESSURE: 82 MMHG

## 2019-05-16 DIAGNOSIS — R42 DIZZINESS AND GIDDINESS: ICD-10-CM

## 2019-05-16 DIAGNOSIS — N40.0 BENIGN PROSTATIC HYPERPLASIA WITHOUT LOWER URINARY TRACT SYMPTOMS: ICD-10-CM

## 2019-05-16 DIAGNOSIS — Z98.890 OTHER SPECIFIED POSTPROCEDURAL STATES: Chronic | ICD-10-CM

## 2019-05-16 DIAGNOSIS — D72.825 BANDEMIA: ICD-10-CM

## 2019-05-16 DIAGNOSIS — E78.5 HYPERLIPIDEMIA, UNSPECIFIED: ICD-10-CM

## 2019-05-16 DIAGNOSIS — A41.9 SEPSIS, UNSPECIFIED ORGANISM: ICD-10-CM

## 2019-05-16 DIAGNOSIS — Z29.9 ENCOUNTER FOR PROPHYLACTIC MEASURES, UNSPECIFIED: ICD-10-CM

## 2019-05-16 DIAGNOSIS — K22.70 BARRETT'S ESOPHAGUS WITHOUT DYSPLASIA: ICD-10-CM

## 2019-05-16 DIAGNOSIS — I10 ESSENTIAL (PRIMARY) HYPERTENSION: ICD-10-CM

## 2019-05-16 LAB
ALBUMIN SERPL ELPH-MCNC: 3.9 G/DL — SIGNIFICANT CHANGE UP (ref 3.3–5)
ALP SERPL-CCNC: 100 U/L — SIGNIFICANT CHANGE UP (ref 40–120)
ALT FLD-CCNC: 34 U/L — SIGNIFICANT CHANGE UP (ref 12–78)
ANION GAP SERPL CALC-SCNC: 10 MMOL/L — SIGNIFICANT CHANGE UP (ref 5–17)
APPEARANCE UR: CLEAR — SIGNIFICANT CHANGE UP
AST SERPL-CCNC: 33 U/L — SIGNIFICANT CHANGE UP (ref 15–37)
BASOPHILS # BLD AUTO: 0 K/UL — SIGNIFICANT CHANGE UP (ref 0–0.2)
BASOPHILS NFR BLD AUTO: 0 % — SIGNIFICANT CHANGE UP (ref 0–2)
BILIRUB SERPL-MCNC: 1.1 MG/DL — SIGNIFICANT CHANGE UP (ref 0.2–1.2)
BILIRUB UR-MCNC: NEGATIVE — SIGNIFICANT CHANGE UP
BUN SERPL-MCNC: 29 MG/DL — HIGH (ref 7–23)
CALCIUM SERPL-MCNC: 8.9 MG/DL — SIGNIFICANT CHANGE UP (ref 8.5–10.1)
CHLORIDE SERPL-SCNC: 104 MMOL/L — SIGNIFICANT CHANGE UP (ref 96–108)
CO2 SERPL-SCNC: 23 MMOL/L — SIGNIFICANT CHANGE UP (ref 22–31)
COLOR SPEC: YELLOW — SIGNIFICANT CHANGE UP
CREAT SERPL-MCNC: 1.4 MG/DL — HIGH (ref 0.5–1.3)
DIFF PNL FLD: NEGATIVE — SIGNIFICANT CHANGE UP
EOSINOPHIL # BLD AUTO: 0 K/UL — SIGNIFICANT CHANGE UP (ref 0–0.5)
EOSINOPHIL NFR BLD AUTO: 0 % — SIGNIFICANT CHANGE UP (ref 0–6)
GLUCOSE SERPL-MCNC: 125 MG/DL — HIGH (ref 70–99)
GLUCOSE UR QL: NEGATIVE — SIGNIFICANT CHANGE UP
HCT VFR BLD CALC: 47.5 % — SIGNIFICANT CHANGE UP (ref 39–50)
HCT VFR BLD CALC: 47.6 % — SIGNIFICANT CHANGE UP (ref 39–50)
HCT VFR BLD CALC: 50.6 % — HIGH (ref 39–50)
HGB BLD-MCNC: 16.2 G/DL — SIGNIFICANT CHANGE UP (ref 13–17)
HGB BLD-MCNC: 16.4 G/DL — SIGNIFICANT CHANGE UP (ref 13–17)
HGB BLD-MCNC: 17.1 G/DL — HIGH (ref 13–17)
KETONES UR-MCNC: ABNORMAL
LACTATE SERPL-SCNC: 1.9 MMOL/L — SIGNIFICANT CHANGE UP (ref 0.7–2)
LEUKOCYTE ESTERASE UR-ACNC: NEGATIVE — SIGNIFICANT CHANGE UP
LIDOCAIN IGE QN: 162 U/L — SIGNIFICANT CHANGE UP (ref 73–393)
LYMPHOCYTES # BLD AUTO: 0.17 K/UL — LOW (ref 1–3.3)
LYMPHOCYTES # BLD AUTO: 0.24 K/UL — LOW (ref 1–3.3)
LYMPHOCYTES # BLD AUTO: 0.5 K/UL — LOW (ref 1–3.3)
LYMPHOCYTES # BLD AUTO: 2 % — LOW (ref 13–44)
LYMPHOCYTES # BLD AUTO: 3 % — LOW (ref 13–44)
LYMPHOCYTES # BLD AUTO: 6 % — LOW (ref 13–44)
MCHC RBC-ENTMCNC: 30.2 PG — SIGNIFICANT CHANGE UP (ref 27–34)
MCHC RBC-ENTMCNC: 30.3 PG — SIGNIFICANT CHANGE UP (ref 27–34)
MCHC RBC-ENTMCNC: 30.3 PG — SIGNIFICANT CHANGE UP (ref 27–34)
MCHC RBC-ENTMCNC: 33.8 GM/DL — SIGNIFICANT CHANGE UP (ref 32–36)
MCHC RBC-ENTMCNC: 34 GM/DL — SIGNIFICANT CHANGE UP (ref 32–36)
MCHC RBC-ENTMCNC: 34.5 GM/DL — SIGNIFICANT CHANGE UP (ref 32–36)
MCV RBC AUTO: 87.8 FL — SIGNIFICANT CHANGE UP (ref 80–100)
MCV RBC AUTO: 89.1 FL — SIGNIFICANT CHANGE UP (ref 80–100)
MCV RBC AUTO: 89.2 FL — SIGNIFICANT CHANGE UP (ref 80–100)
MONOCYTES # BLD AUTO: 0 K/UL — SIGNIFICANT CHANGE UP (ref 0–0.9)
MONOCYTES # BLD AUTO: 0.08 K/UL — SIGNIFICANT CHANGE UP (ref 0–0.9)
MONOCYTES # BLD AUTO: 0.24 K/UL — SIGNIFICANT CHANGE UP (ref 0–0.9)
MONOCYTES NFR BLD AUTO: 0 % — LOW (ref 2–14)
MONOCYTES NFR BLD AUTO: 1 % — LOW (ref 2–14)
MONOCYTES NFR BLD AUTO: 3 % — SIGNIFICANT CHANGE UP (ref 2–14)
NEUTROPHILS # BLD AUTO: 7.24 K/UL — SIGNIFICANT CHANGE UP (ref 1.8–7.4)
NEUTROPHILS # BLD AUTO: 7.53 K/UL — HIGH (ref 1.8–7.4)
NEUTROPHILS # BLD AUTO: 7.93 K/UL — HIGH (ref 1.8–7.4)
NEUTROPHILS NFR BLD AUTO: 59 % — SIGNIFICANT CHANGE UP (ref 43–77)
NEUTROPHILS NFR BLD AUTO: 63 % — SIGNIFICANT CHANGE UP (ref 43–77)
NEUTROPHILS NFR BLD AUTO: 80 % — HIGH (ref 43–77)
NITRITE UR-MCNC: NEGATIVE — SIGNIFICANT CHANGE UP
NRBC # BLD: SIGNIFICANT CHANGE UP /100 WBCS (ref 0–0)
PH UR: 6.5 — SIGNIFICANT CHANGE UP (ref 5–8)
PLATELET # BLD AUTO: 164 K/UL — SIGNIFICANT CHANGE UP (ref 150–400)
PLATELET # BLD AUTO: 174 K/UL — SIGNIFICANT CHANGE UP (ref 150–400)
PLATELET # BLD AUTO: 186 K/UL — SIGNIFICANT CHANGE UP (ref 150–400)
POTASSIUM SERPL-MCNC: 4.6 MMOL/L — SIGNIFICANT CHANGE UP (ref 3.5–5.3)
POTASSIUM SERPL-SCNC: 4.6 MMOL/L — SIGNIFICANT CHANGE UP (ref 3.5–5.3)
PROCALCITONIN SERPL-MCNC: 0.05 NG/ML — HIGH (ref 0–0.04)
PROT SERPL-MCNC: 7.8 G/DL — SIGNIFICANT CHANGE UP (ref 6–8.3)
PROT UR-MCNC: 25 MG/DL
RBC # BLD: 5.34 M/UL — SIGNIFICANT CHANGE UP (ref 4.2–5.8)
RBC # BLD: 5.41 M/UL — SIGNIFICANT CHANGE UP (ref 4.2–5.8)
RBC # BLD: 5.67 M/UL — SIGNIFICANT CHANGE UP (ref 4.2–5.8)
RBC # FLD: 13.7 % — SIGNIFICANT CHANGE UP (ref 10.3–14.5)
RBC # FLD: 13.7 % — SIGNIFICANT CHANGE UP (ref 10.3–14.5)
RBC # FLD: 13.9 % — SIGNIFICANT CHANGE UP (ref 10.3–14.5)
SODIUM SERPL-SCNC: 137 MMOL/L — SIGNIFICANT CHANGE UP (ref 135–145)
SP GR SPEC: 1.01 — SIGNIFICANT CHANGE UP (ref 1.01–1.02)
TROPONIN I SERPL-MCNC: <.015 NG/ML — SIGNIFICANT CHANGE UP (ref 0.01–0.04)
TROPONIN I SERPL-MCNC: <.015 NG/ML — SIGNIFICANT CHANGE UP (ref 0.01–0.04)
UROBILINOGEN FLD QL: NEGATIVE — SIGNIFICANT CHANGE UP
WBC # BLD: 7.87 K/UL — SIGNIFICANT CHANGE UP (ref 3.8–10.5)
WBC # BLD: 8.26 K/UL — SIGNIFICANT CHANGE UP (ref 3.8–10.5)
WBC # BLD: 8.28 K/UL — SIGNIFICANT CHANGE UP (ref 3.8–10.5)
WBC # FLD AUTO: 7.87 K/UL — SIGNIFICANT CHANGE UP (ref 3.8–10.5)
WBC # FLD AUTO: 8.26 K/UL — SIGNIFICANT CHANGE UP (ref 3.8–10.5)
WBC # FLD AUTO: 8.28 K/UL — SIGNIFICANT CHANGE UP (ref 3.8–10.5)

## 2019-05-16 PROCEDURE — 70450 CT HEAD/BRAIN W/O DYE: CPT | Mod: 26

## 2019-05-16 PROCEDURE — 99285 EMERGENCY DEPT VISIT HI MDM: CPT

## 2019-05-16 PROCEDURE — 99223 1ST HOSP IP/OBS HIGH 75: CPT | Mod: GC,AI

## 2019-05-16 PROCEDURE — 71045 X-RAY EXAM CHEST 1 VIEW: CPT | Mod: 26

## 2019-05-16 RX ORDER — LISINOPRIL 2.5 MG/1
20 TABLET ORAL
Refills: 0 | Status: DISCONTINUED | OUTPATIENT
Start: 2019-05-16 | End: 2019-05-17

## 2019-05-16 RX ORDER — PANTOPRAZOLE SODIUM 20 MG/1
40 TABLET, DELAYED RELEASE ORAL
Refills: 0 | Status: DISCONTINUED | OUTPATIENT
Start: 2019-05-16 | End: 2019-05-17

## 2019-05-16 RX ORDER — DIAZEPAM 5 MG
2 TABLET ORAL ONCE
Refills: 0 | Status: DISCONTINUED | OUTPATIENT
Start: 2019-05-16 | End: 2019-05-16

## 2019-05-16 RX ORDER — DOXAZOSIN MESYLATE 4 MG
2 TABLET ORAL DAILY
Refills: 0 | Status: DISCONTINUED | OUTPATIENT
Start: 2019-05-16 | End: 2019-05-17

## 2019-05-16 RX ORDER — ASPIRIN/CALCIUM CARB/MAGNESIUM 324 MG
81 TABLET ORAL DAILY
Refills: 0 | Status: DISCONTINUED | OUTPATIENT
Start: 2019-05-16 | End: 2019-05-17

## 2019-05-16 RX ORDER — LORATADINE 10 MG/1
1 TABLET ORAL
Qty: 10 | Refills: 0
Start: 2019-05-16 | End: 2019-05-25

## 2019-05-16 RX ORDER — HYDRALAZINE HCL 50 MG
25 TABLET ORAL THREE TIMES A DAY
Refills: 0 | Status: DISCONTINUED | OUTPATIENT
Start: 2019-05-16 | End: 2019-05-17

## 2019-05-16 RX ORDER — ENOXAPARIN SODIUM 100 MG/ML
40 INJECTION SUBCUTANEOUS EVERY 24 HOURS
Refills: 0 | Status: DISCONTINUED | OUTPATIENT
Start: 2019-05-16 | End: 2019-05-17

## 2019-05-16 RX ORDER — SODIUM CHLORIDE 9 MG/ML
3 INJECTION INTRAMUSCULAR; INTRAVENOUS; SUBCUTANEOUS ONCE
Refills: 0 | Status: COMPLETED | OUTPATIENT
Start: 2019-05-16 | End: 2019-05-16

## 2019-05-16 RX ORDER — MECLIZINE HCL 12.5 MG
25 TABLET ORAL ONCE
Refills: 0 | Status: COMPLETED | OUTPATIENT
Start: 2019-05-16 | End: 2019-05-16

## 2019-05-16 RX ORDER — AMLODIPINE BESYLATE 2.5 MG/1
10 TABLET ORAL DAILY
Refills: 0 | Status: DISCONTINUED | OUTPATIENT
Start: 2019-05-16 | End: 2019-05-17

## 2019-05-16 RX ORDER — GABAPENTIN 400 MG/1
100 CAPSULE ORAL
Refills: 0 | Status: DISCONTINUED | OUTPATIENT
Start: 2019-05-16 | End: 2019-05-17

## 2019-05-16 RX ORDER — SODIUM CHLORIDE 9 MG/ML
500 INJECTION INTRAMUSCULAR; INTRAVENOUS; SUBCUTANEOUS ONCE
Refills: 0 | Status: COMPLETED | OUTPATIENT
Start: 2019-05-16 | End: 2019-05-16

## 2019-05-16 RX ORDER — FINASTERIDE 5 MG/1
5 TABLET, FILM COATED ORAL DAILY
Refills: 0 | Status: DISCONTINUED | OUTPATIENT
Start: 2019-05-16 | End: 2019-05-17

## 2019-05-16 RX ORDER — ONDANSETRON 8 MG/1
4 TABLET, FILM COATED ORAL ONCE
Refills: 0 | Status: COMPLETED | OUTPATIENT
Start: 2019-05-16 | End: 2019-05-16

## 2019-05-16 RX ORDER — CEFTRIAXONE 500 MG/1
1 INJECTION, POWDER, FOR SOLUTION INTRAMUSCULAR; INTRAVENOUS ONCE
Refills: 0 | Status: COMPLETED | OUTPATIENT
Start: 2019-05-16 | End: 2019-05-16

## 2019-05-16 RX ORDER — ONDANSETRON 8 MG/1
1 TABLET, FILM COATED ORAL
Qty: 15 | Refills: 0
Start: 2019-05-16 | End: 2019-05-20

## 2019-05-16 RX ORDER — SODIUM CHLORIDE 9 MG/ML
1000 INJECTION INTRAMUSCULAR; INTRAVENOUS; SUBCUTANEOUS ONCE
Refills: 0 | Status: COMPLETED | OUTPATIENT
Start: 2019-05-16 | End: 2019-05-16

## 2019-05-16 RX ORDER — ATORVASTATIN CALCIUM 80 MG/1
10 TABLET, FILM COATED ORAL AT BEDTIME
Refills: 0 | Status: DISCONTINUED | OUTPATIENT
Start: 2019-05-16 | End: 2019-05-17

## 2019-05-16 RX ADMIN — CEFTRIAXONE 1 GRAM(S): 500 INJECTION, POWDER, FOR SOLUTION INTRAMUSCULAR; INTRAVENOUS at 20:43

## 2019-05-16 RX ADMIN — Medication 2 MILLIGRAM(S): at 15:04

## 2019-05-16 RX ADMIN — SODIUM CHLORIDE 1000 MILLILITER(S): 9 INJECTION INTRAMUSCULAR; INTRAVENOUS; SUBCUTANEOUS at 12:19

## 2019-05-16 RX ADMIN — Medication 25 MILLIGRAM(S): at 13:27

## 2019-05-16 RX ADMIN — SODIUM CHLORIDE 1000 MILLILITER(S): 9 INJECTION INTRAMUSCULAR; INTRAVENOUS; SUBCUTANEOUS at 16:13

## 2019-05-16 RX ADMIN — Medication 125 MILLIGRAM(S): at 13:22

## 2019-05-16 RX ADMIN — ONDANSETRON 4 MILLIGRAM(S): 8 TABLET, FILM COATED ORAL at 13:22

## 2019-05-16 RX ADMIN — CEFTRIAXONE 100 GRAM(S): 500 INJECTION, POWDER, FOR SOLUTION INTRAMUSCULAR; INTRAVENOUS at 20:13

## 2019-05-16 RX ADMIN — ONDANSETRON 4 MILLIGRAM(S): 8 TABLET, FILM COATED ORAL at 15:04

## 2019-05-16 RX ADMIN — SODIUM CHLORIDE 500 MILLILITER(S): 9 INJECTION INTRAMUSCULAR; INTRAVENOUS; SUBCUTANEOUS at 16:13

## 2019-05-16 RX ADMIN — SODIUM CHLORIDE 3 MILLILITER(S): 9 INJECTION INTRAMUSCULAR; INTRAVENOUS; SUBCUTANEOUS at 13:22

## 2019-05-16 NOTE — ED ADULT NURSE NOTE - NSIMPLEMENTINTERV_GEN_ALL_ED
Implemented All Fall with Harm Risk Interventions:  [place] to call system. Call bell, personal items and telephone within reach. Instruct patient to call for assistance. Room bathroom lighting operational. Non-slip footwear when patient is off stretcher. Physically safe environment: no spills, clutter or unnecessary equipment. Stretcher in lowest position, wheels locked, appropriate side rails in place. Provide visual cue, wrist band, yellow gown, etc. Monitor gait and stability. Monitor for mental status changes and reorient to person, place, and time. Review medications for side effects contributing to fall risk. Reinforce activity limits and safety measures with patient and family. Provide visual clues: red socks.

## 2019-05-16 NOTE — H&P ADULT - PROBLEM SELECTOR PLAN 4
continue Hydralazine, amlodipine, ? Lisinopril exacerbation of cough hard to tell given pt with hx of recent diagnosis of COPD continue Hydralazine, amlodipine, Lisinopril

## 2019-05-16 NOTE — H&P ADULT - NSHPSOCIALHISTORY_GEN_ALL_CORE
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

## 2019-05-16 NOTE — ED ADULT NURSE NOTE - CHPI ED NUR SYMPTOMS NEG
no fever/no change in level of consciousness/no loss of consciousness/no confusion/no weakness/no blurred vision

## 2019-05-16 NOTE — H&P ADULT - HISTORY OF PRESENT ILLNESS
87yo M w/ PMHx CKD, HTN, HLD, BPH, melanoma on RLE, Vertigo presented to ED c/o dizziness. Pt was seen last year with a cc of elevated BP and BPV.  He was admitted to the ED treated and symptoms resolved.  Pt reports no acute issues since that time.  He reports that they recently returned home from [place] last evening and travelled via [place].  Pt reports that symptoms began last night.  Pt reports that he developed dizziness which he describes as the sensation that he was unsteady on his feet.  He reports initially he did not think much of it but when he awoke this morning he reports that symptoms appeared worse and so he took a Meclizine with no improvement. Symptoms are worsened with movement. He further reports a diffuse mild HA, nausea, and associated abdominal discomfort.  Pt wife reports that he appears to be breathing heavier then normal but pt denies SOB.  Denies fever, chills, V/D/C, CP, ext numbness or weakness.     In the ED, afebrile, remaining of vitals wnl, labs pertinent for bandemia 37%, procalcitonin 0.05, lactate 1.9, BUN 29, Cr 1.4 at baseline, lipase 162, CE negative x 2, UA wnl, CXR unremarkable, EKG..... Pt received rocephine IV x 1, 1.5L NS bolus, Meclozine 25mg PO, Solu-medrol IV 125mg x1, Zofran 4mg IVP x 2. 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 Pt was seen last year with a cc of elevated BP and BPV.  He was admitted to the ED treated and symptoms resolved.  Pt reports no acute issues since that time.  He reports that they recently returned home from [place] last evening and travelled via [place].  Pt reports that symptoms began last night.  Pt reports that he developed dizziness which he describes as the sensation that he was unsteady on his feet.  He reports initially he did not think much of it but when he awoke this morning he reports that symptoms appeared worse and so he took a Meclizine with no improvement. Symptoms are worsened with movement. He further reports a diffuse mild HA, nausea, and associated abdominal discomfort.  Pt wife reports that he appears to be breathing heavier then normal but pt denies SOB.  Denies fever, chills, V/D/C, CP, palpitations, ext numbness or weakness. Pt endorse to irregular beat noted on his BP machines occasionally but denies any association with cp, palpitations or dizziness or lightheadedness.  Pt admits to chronic cough but states cough seem to be worsen since he was switched to Lisinopril few month ago due to Valsartan recall.  As per wife with worsening hoarseness of voice.        In the ED, afebrile, remaining of vitals wnl, labs pertinent for bandemia 37%, procalcitonin 0.05, lactate 1.9, BUN 29, Cr 1.4 at baseline, lipase 162, CE negative x 2, UA wnl, CXR unremarkable, EKG showed sinus rhythm with occasional PVCs, nonspecific T wave inversion V5-6. Pt received rocephine IV x 1, 1.5L NS bolus, Meclozine 25mg PO, Solu-medrol IV 125mg x1, Zofran 4mg IVP x 2. 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 Pt was seen last year with a cc of elevated BP and BPV.  He was admitted to the ED treated and symptoms resolved.  Pt reports no acute issues since that time.  He reports that they recently returned home from [place] last evening and travelled via [place].  Pt reports that symptoms began last night.  Pt reports that he developed dizziness which he describes as the sensation that he was unsteady on his feet.  He reports initially he did not think much of it but when he awoke this morning he reports that symptoms appeared worse and so he took a Meclizine with no improvement. Symptoms are worsened with movement. He further reports a diffuse mild HA, nausea, and associated abdominal discomfort.  Pt wife reports that he appears to be breathing heavier then normal but pt denies SOB.  Denies fever, chills, V/D/C, CP, palpitations, ext numbness or weakness. Pt endorse to irregular beat noted on his BP machines occasionally but denies any association with cp, palpitations or dizziness or lightheadedness.  Pt admits to chronic cough but states cough seem to be worsen since he was switched to Lisinopril few month ago due to Valsartan recall.  As per wife with worsening hoarseness of voice.        In the ED, afebrile, remaining of vitals wnl, labs pertinent for bandemia 37%, procalcitonin 0.05, lactate 1.9, BUN 29, Cr 1.4 at baseline, lipase 162, CE negative x 2, UA wnl, CXR unremarkable, EKG showed sinus rhythm with occasional PVCs, nonspecific T wave inversion V5-6. Pt received rocephine IV x 1, 1.5L NS bolus, Meclozine 25mg PO, Solu-medrol IV 125mg x1, Zofran 4mg IVP x 2. 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 Pt was seen last year with a cc of elevated BP and BPV.  He was admitted to the ED treated and symptoms resolved.  Pt reports no acute issues since that time.  He reports that they recently returned home from [place] last evening and travelled via [place].  Pt reports that symptoms began last night.  Pt reports that he developed dizziness which he describes as the sensation that he was unsteady on his feet.  He reports initially he did not think much of it but when he awoke this morning he reports that symptoms appeared worse and so he took a Meclizine with no improvement. Symptoms are worsened with movement. He further reports a diffuse mild HA, nausea, and associated abdominal discomfort.  Pt wife reports that he appears to be breathing heavier then normal but pt denies SOB.  Denies fever, chills, V/D/C, CP, palpitations, ext numbness or weakness. Pt endorse to irregular beat noted on his BP machines occasionally but denies any association with cp, palpitations or dizziness or lightheadedness.  Pt admits to chronic cough but states cough seems a little worse since he was switched to Lisinopril few month from Valsartan but hard to tell since he was also diagnosed with COPD recenly.  As per wife with worsening hoarseness of voice.        In the ED, afebrile, remaining of vitals wnl, labs pertinent for bandemia 37%, procalcitonin 0.05, lactate 1.9, BUN 29, Cr 1.4 at baseline, lipase 162, CE negative x 2, UA wnl, CXR unremarkable, EKG showed sinus rhythm with occasional PVCs, nonspecific T wave inversion V5-6. Pt received rocephine IV x 1, 1.5L NS bolus, Meclozine 25mg PO, Solu-medrol IV 125mg x1, Zofran 4mg IVP x 2.

## 2019-05-16 NOTE — ED PROVIDER NOTE - CLINICAL SUMMARY MEDICAL DECISION MAKING FREE TEXT BOX
Pt is a 89 yo male who presents to the ED with a cc of dizziness.  PMhx of BPH, HLD, HTN, h/o vertigo

## 2019-05-16 NOTE — H&P ADULT - NSHPPHYSICALEXAM_GEN_ALL_CORE
Height (cm): 162.56 (05-16 @ 11:43)  Weight (kg): 76.7 (05-16 @ 11:43)  BMI (kg/m2): 29 (05-16 @ 11:43)  BSA (m2): 1.82 (05-16 @ 11:43)  Vital Signs Last 24 Hrs  T(C): 36.4 (16 May 2019 18:55), Max: 36.7 (16 May 2019 11:43)  T(F): 97.6 (16 May 2019 18:55), Max: 98 (16 May 2019 11:43)  HR: 94 (16 May 2019 20:13) (68 - 94)  BP: 149/74 (16 May 2019 20:13) (149/74 - 177/81)  BP(mean): --  RR: 16 (16 May 2019 20:13) (16 - 16)  SpO2: 97% (16 May 2019 20:13) (94% - 100%)  [ ] room air   [ ] 02    PHYSICAL EXAM:  GENERAL:  No acute distress, well appearing, resting comfortably in bed  HEAD:  atraumatic, normocephalic   ENMT: PERRLA, EOMI, moist , no erythema, or exudate   NECK:  Suppled, No JVD   NERVOUS SYSTEM:  Alert & Oriented X3, no focal deficits, strength 5/5, sensation intact, CN intact   CHEST/LUNG: Clear to auscultation bilaterally, no wheezing,  rhonchi, rales or crackles  HEART:  Regular rate and rhythm, No murmurs, rubs, or gallops  ABDOMEN:  soft, nontender, nondistended, positive bowel soundsx4, no rebound tenderness or guarding    EXTREMITIES: No clubbing, cyanosis or edema  SKIN: no venous stasis skin changes Height (cm): 162.56 (05-16 @ 11:43)  Weight (kg): 76.7 (05-16 @ 11:43)  BMI (kg/m2): 29 (05-16 @ 11:43)  BSA (m2): 1.82 (05-16 @ 11:43)  Vital Signs Last 24 Hrs  T(C): 36.4 (16 May 2019 18:55), Max: 36.7 (16 May 2019 11:43)  T(F): 97.6 (16 May 2019 18:55), Max: 98 (16 May 2019 11:43)  HR: 94 (16 May 2019 20:13) (68 - 94)  BP: 149/74 (16 May 2019 20:13) (149/74 - 177/81)  BP(mean): --  RR: 16 (16 May 2019 20:13) (16 - 16)  SpO2: 97% (16 May 2019 20:13) (94% - 100%)  [ ] room air   [ ] 02    PHYSICAL EXAM:  GENERAL:  No acute distress, well appearing, resting comfortably in bed  HEAD:  atraumatic, normocephalic   ENMT: PERRLA, EOMI, moist , no erythema, or exudate   NECK:  Suppled, No JVD   NERVOUS SYSTEM:  Alert & Oriented X3, no focal deficits, strength 5/5, sensation intact, CN intact   CHEST/LUNG: Clear to auscultation bilaterally, no wheezing,  rhonchi, rales or crackles  HEART:  Regular rate and rhythm, 3/6 systolic murmurs, no rubs, or gallops  ABDOMEN:  obese, soft, nontender, nondistended, positive bowel soundsx4, no rebound tenderness or guarding    EXTREMITIES: No clubbing, cyanosis or edema  SKIN: no venous stasis skin changes

## 2019-05-16 NOTE — H&P ADULT - NSHPREVIEWOFSYSTEMS_GEN_ALL_CORE
REVIEW OF SYSTEMS:  CONSTITUTIONAL: fever, chills, fatigue, myalgia, Body ache  EYES: No eye pain, visual disturbances, or discharge  ENMT:  No vertigo, sinus or throat pain  NECK: No pain, stiffness  RESPIRATORY: No cough, wheezing, hemoptysis, shortness of breath  CARDIOVASCULAR: No chest pain, palpitations, leg swelling, dizziness or lightheadedness   GASTROINTESTINAL: No abdominal or epigastric pain, nausea, vomiting, diarrhea or constipation  GENITOURINARY: No dysuria, frequency, urgency, hematuria, or incontinence  NEUROLOGICAL:  No headaches, No dizziness, No numbness, weakness   SKIN:   No itching, burning, rashes, or lesions   MUSCULOSKELETAL: No joint pain or swelling,  muscle pain, back pain, extremity pain  PSYCHIATRIC: No depression, anxiety, mood swings, or difficulty sleeping REVIEW OF SYSTEMS:  CONSTITUTIONAL: fever, chills, fatigue, myalgia, Body ache  EYES: No eye pain, visual disturbances, or discharge  ENMT:  No vertigo, sinus or throat pain  NECK: No pain, stiffness  RESPIRATORY: + chronic cough ? Lisinopril induced, no wheezing, hemoptysis, shortness of breath  CARDIOVASCULAR: +irregular beat, +dizziness, No chest pain, palpitations, leg swelling, or lightheadedness   GASTROINTESTINAL: No abdominal or epigastric pain, nausea, vomiting, diarrhea or constipation  GENITOURINARY: No dysuria, frequency, urgency, hematuria, or incontinence  NEUROLOGICAL:  No headaches, No dizziness, No numbness, weakness   SKIN:   No itching, burning, rashes, or lesions   MUSCULOSKELETAL: No joint pain or swelling,  muscle pain, back pain, extremity pain  PSYCHIATRIC: No depression, anxiety, mood swings, or difficulty sleeping

## 2019-05-16 NOTE — H&P ADULT - ATTENDING COMMENTS
worsening bandemia.  Afebrile.  Conservative treatment.  F/U RVP.  Monitor for fevers. Likely viral infection.  Consider ID consult if symptoms worsen.

## 2019-05-16 NOTE — H&P ADULT - NSICDXPASTMEDICALHX_GEN_ALL_CORE_FT
PAST MEDICAL HISTORY:  BPH (benign prostatic hyperplasia)     Hyperlipidemia     Hypertension     Skin melanoma     Vertigo PAST MEDICAL HISTORY:  Prater esophagus     BPH (benign prostatic hyperplasia)     Hyperlipidemia     Hypertension     Peripheral neuropathy     Skin melanoma     Vertigo

## 2019-05-16 NOTE — ED PROVIDER NOTE - NEUROLOGICAL, MLM
Alert and oriented, no focal deficits, no motor or sensory deficits. CN II-XII intact with no focal deficits noted

## 2019-05-16 NOTE — ED ADULT TRIAGE NOTE - HEART RATE (BEATS/MIN)
Discussed symptomatic care such as humidifier and normal saline drops in order to loosen mucus with bulb suction. Alternating tylenol and ibuprofen every 3 hours if fevers/ aches/ pains. Discouraged use of OTC cough/ cold preparations given lack of efficacy and risks associated with them.   
68

## 2019-05-16 NOTE — H&P ADULT - PROBLEM SELECTOR PLAN 1
-likely viral etiology, no sing of acute infection at this time   -CXR unremarkable, afebrile, wbc wnl   -s/p rocephine IV x 1 dose in the ED  -f/u AM cbc with diff   -s/p 1.5L IVF bolus -likely viral etiology, no sign of acute infectious process at this time   -CXR unremarkable, afebrile, wbc wnl   -s/p Rocephin IV x 1 dose in the ED  -f/u AM cbc with diff   -s/p 1.5L IVF bolus

## 2019-05-16 NOTE — ED ADULT NURSE NOTE - OBJECTIVE STATEMENT
patient came in ED from home BIBA with c/o dizziness, feeling unsteady on his feet and nausea since last night. alert and oriented X 4. non-labored respiration noted. no facial droop, no arm drift, no slurred speech. denies chest pain. patient added frontal headache. no LOC. patient was s/e by Dr Lomax, EKG done and reviewed by MD. on fall precaution. spouse at the bedside. will continue to monitor.

## 2019-05-16 NOTE — ED ADULT NURSE REASSESSMENT NOTE - NS ED NURSE REASSESS COMMENT FT1
Patient can eat as per Dr. Jefferson.
patient stated his dizziness and headache improved at this time. vital sign taken and recorded. DR Lomax explained test results to the patient. kept on fall precaution. will monitor.

## 2019-05-16 NOTE — ED PROVIDER NOTE - OBJECTIVE STATEMENT
monitor. Pt is a 87 yo male who presents to the ED with a cc of dizziness.  PMhx of BPH, HLD, HTN, h/o vertigo.  Pt was seen last year with a cc of elevated BP and BPV.  He was admitted to the ED treated and symptoms resolved.  Pt reports no acute issues since that time.  He reports that they recently returned home from [place] last evening and travelled via [place].  Pt reports that symptoms began last night.  Pt reports that he developed dizziness which he describes as the sensation that he was unsteady on his feet.  He reports initially he did not think much of it but when he awoke this morning he reports that symptoms appeared worse and so he took a Meclizine with no improvement. Symptoms are worsened with movement. He further reports a diffuse mild HA, nausea, and associated abdominal discomfort.  Pt wife reports that he appears to be breathing heavier then normal but pt denies SOB.  Denies fever, chills, V/D/C, CP, ext numbness or weakness

## 2019-05-16 NOTE — ED ADULT NURSE NOTE - NSSUSCREENINGQ3_ED_ALL_ED
Onset:  2/10 930PM  Location/Description:  R side cramping continous last 2 horus  Precipitating Factors: Pregnant 11 weeks  Pain Scale (1-10), 10 highest:  10/10  Associated Symptoms:  Vomiting   LMP: No LMP recorded. Patient is pregnant.  EDC:  -  Gestational Age:  -  Blood Type: -  OB History:   Obstetric History       T0      TAB0   SAB0   E0   M0   L0            Vaginal/C Section:  Hx miscarriage  Group B Strep (pos or neg):  -  History of previous Labor & Delivery:  -  Recent Care:  See chart. No PCP  Care advice provided.     Reason for Disposition  • MODERATE-SEVERE abdominal pain (e.g., interferes with normal activities, awakens from sleep)    Protocols used: PREGNANCY - ABDOMINAL PAIN LESS THAN 20 WEEKS EGA-A-       No

## 2019-05-16 NOTE — H&P ADULT - ASSESSMENT
87yo M w/ PMHx CKD, HTN, HLD, BPH, melanoma on RLE, hx of Vertigo presented to ED c/o dizziness found to have bandemia of unknown source admitted for bandemia. 89yo M w/ PMHx CKD, HTN, HLD, BPH, melanoma on RLE, hx of Vertigo presented to ED c/o dizziness found to have bandemia of unknown source.

## 2019-05-17 ENCOUNTER — TRANSCRIPTION ENCOUNTER (OUTPATIENT)
Age: 84
End: 2019-05-17

## 2019-05-17 VITALS
RESPIRATION RATE: 18 BRPM | OXYGEN SATURATION: 93 % | DIASTOLIC BLOOD PRESSURE: 74 MMHG | SYSTOLIC BLOOD PRESSURE: 153 MMHG | HEART RATE: 66 BPM | TEMPERATURE: 97 F

## 2019-05-17 LAB
ALBUMIN SERPL ELPH-MCNC: 3.5 G/DL — SIGNIFICANT CHANGE UP (ref 3.3–5)
ALP SERPL-CCNC: 89 U/L — SIGNIFICANT CHANGE UP (ref 40–120)
ALT FLD-CCNC: 30 U/L — SIGNIFICANT CHANGE UP (ref 12–78)
ANION GAP SERPL CALC-SCNC: 9 MMOL/L — SIGNIFICANT CHANGE UP (ref 5–17)
AST SERPL-CCNC: 27 U/L — SIGNIFICANT CHANGE UP (ref 15–37)
BASOPHILS # BLD AUTO: 0.01 K/UL — SIGNIFICANT CHANGE UP (ref 0–0.2)
BASOPHILS NFR BLD AUTO: 0.1 % — SIGNIFICANT CHANGE UP (ref 0–2)
BILIRUB SERPL-MCNC: 0.8 MG/DL — SIGNIFICANT CHANGE UP (ref 0.2–1.2)
BUN SERPL-MCNC: 25 MG/DL — HIGH (ref 7–23)
CALCIUM SERPL-MCNC: 8.2 MG/DL — LOW (ref 8.5–10.1)
CHLORIDE SERPL-SCNC: 108 MMOL/L — SIGNIFICANT CHANGE UP (ref 96–108)
CO2 SERPL-SCNC: 25 MMOL/L — SIGNIFICANT CHANGE UP (ref 22–31)
CREAT SERPL-MCNC: 1.5 MG/DL — HIGH (ref 0.5–1.3)
EOSINOPHIL # BLD AUTO: 0 K/UL — SIGNIFICANT CHANGE UP (ref 0–0.5)
EOSINOPHIL NFR BLD AUTO: 0 % — SIGNIFICANT CHANGE UP (ref 0–6)
GLUCOSE SERPL-MCNC: 120 MG/DL — HIGH (ref 70–99)
HCT VFR BLD CALC: 46.6 % — SIGNIFICANT CHANGE UP (ref 39–50)
HGB BLD-MCNC: 15.7 G/DL — SIGNIFICANT CHANGE UP (ref 13–17)
IMM GRANULOCYTES NFR BLD AUTO: 1.4 % — SIGNIFICANT CHANGE UP (ref 0–1.5)
LYMPHOCYTES # BLD AUTO: 0.5 K/UL — LOW (ref 1–3.3)
LYMPHOCYTES # BLD AUTO: 4.1 % — LOW (ref 13–44)
MCHC RBC-ENTMCNC: 30.1 PG — SIGNIFICANT CHANGE UP (ref 27–34)
MCHC RBC-ENTMCNC: 33.7 GM/DL — SIGNIFICANT CHANGE UP (ref 32–36)
MCV RBC AUTO: 89.3 FL — SIGNIFICANT CHANGE UP (ref 80–100)
MONOCYTES # BLD AUTO: 0.43 K/UL — SIGNIFICANT CHANGE UP (ref 0–0.9)
MONOCYTES NFR BLD AUTO: 3.5 % — SIGNIFICANT CHANGE UP (ref 2–14)
NEUTROPHILS # BLD AUTO: 11.05 K/UL — HIGH (ref 1.8–7.4)
NEUTROPHILS NFR BLD AUTO: 90.9 % — HIGH (ref 43–77)
NRBC # BLD: 0 /100 WBCS — SIGNIFICANT CHANGE UP (ref 0–0)
PLATELET # BLD AUTO: 185 K/UL — SIGNIFICANT CHANGE UP (ref 150–400)
POTASSIUM SERPL-MCNC: 4.2 MMOL/L — SIGNIFICANT CHANGE UP (ref 3.5–5.3)
POTASSIUM SERPL-SCNC: 4.2 MMOL/L — SIGNIFICANT CHANGE UP (ref 3.5–5.3)
PROT SERPL-MCNC: 7.1 G/DL — SIGNIFICANT CHANGE UP (ref 6–8.3)
RAPID RVP RESULT: SIGNIFICANT CHANGE UP
RBC # BLD: 5.22 M/UL — SIGNIFICANT CHANGE UP (ref 4.2–5.8)
RBC # FLD: 13.6 % — SIGNIFICANT CHANGE UP (ref 10.3–14.5)
SODIUM SERPL-SCNC: 142 MMOL/L — SIGNIFICANT CHANGE UP (ref 135–145)
WBC # BLD: 12.16 K/UL — HIGH (ref 3.8–10.5)
WBC # FLD AUTO: 12.16 K/UL — HIGH (ref 3.8–10.5)

## 2019-05-17 PROCEDURE — 87486 CHLMYD PNEUM DNA AMP PROBE: CPT

## 2019-05-17 PROCEDURE — 71045 X-RAY EXAM CHEST 1 VIEW: CPT

## 2019-05-17 PROCEDURE — 87040 BLOOD CULTURE FOR BACTERIA: CPT

## 2019-05-17 PROCEDURE — 83690 ASSAY OF LIPASE: CPT

## 2019-05-17 PROCEDURE — 84145 PROCALCITONIN (PCT): CPT

## 2019-05-17 PROCEDURE — 87086 URINE CULTURE/COLONY COUNT: CPT

## 2019-05-17 PROCEDURE — 85027 COMPLETE CBC AUTOMATED: CPT

## 2019-05-17 PROCEDURE — 83605 ASSAY OF LACTIC ACID: CPT

## 2019-05-17 PROCEDURE — 99285 EMERGENCY DEPT VISIT HI MDM: CPT | Mod: 25

## 2019-05-17 PROCEDURE — 80053 COMPREHEN METABOLIC PANEL: CPT

## 2019-05-17 PROCEDURE — 87798 DETECT AGENT NOS DNA AMP: CPT

## 2019-05-17 PROCEDURE — 84484 ASSAY OF TROPONIN QUANT: CPT

## 2019-05-17 PROCEDURE — 36415 COLL VENOUS BLD VENIPUNCTURE: CPT

## 2019-05-17 PROCEDURE — 70450 CT HEAD/BRAIN W/O DYE: CPT

## 2019-05-17 PROCEDURE — 81001 URINALYSIS AUTO W/SCOPE: CPT

## 2019-05-17 PROCEDURE — 87581 M.PNEUMON DNA AMP PROBE: CPT

## 2019-05-17 PROCEDURE — 96375 TX/PRO/DX INJ NEW DRUG ADDON: CPT

## 2019-05-17 PROCEDURE — 96365 THER/PROPH/DIAG IV INF INIT: CPT

## 2019-05-17 PROCEDURE — 87633 RESP VIRUS 12-25 TARGETS: CPT

## 2019-05-17 PROCEDURE — 99239 HOSP IP/OBS DSCHRG MGMT >30: CPT

## 2019-05-17 RX ORDER — LISINOPRIL 2.5 MG/1
1 TABLET ORAL
Qty: 0 | Refills: 0 | DISCHARGE

## 2019-05-17 RX ORDER — DOXAZOSIN MESYLATE 4 MG
1 TABLET ORAL
Qty: 0 | Refills: 0 | DISCHARGE

## 2019-05-17 RX ORDER — HYDRALAZINE HCL 50 MG
1 TABLET ORAL
Qty: 0 | Refills: 0 | DISCHARGE

## 2019-05-17 RX ORDER — MECLIZINE HCL 12.5 MG
1 TABLET ORAL
Qty: 10 | Refills: 0
Start: 2019-05-17

## 2019-05-17 RX ORDER — FINASTERIDE 5 MG/1
1 TABLET, FILM COATED ORAL
Qty: 0 | Refills: 0 | DISCHARGE

## 2019-05-17 RX ORDER — GABAPENTIN 400 MG/1
1 CAPSULE ORAL
Qty: 0 | Refills: 0 | DISCHARGE

## 2019-05-17 RX ORDER — PANTOPRAZOLE SODIUM 20 MG/1
1 TABLET, DELAYED RELEASE ORAL
Qty: 0 | Refills: 0 | DISCHARGE

## 2019-05-17 RX ADMIN — GABAPENTIN 100 MILLIGRAM(S): 400 CAPSULE ORAL at 05:39

## 2019-05-17 RX ADMIN — AMLODIPINE BESYLATE 10 MILLIGRAM(S): 2.5 TABLET ORAL at 05:39

## 2019-05-17 RX ADMIN — Medication 81 MILLIGRAM(S): at 11:37

## 2019-05-17 RX ADMIN — Medication 25 MILLIGRAM(S): at 05:39

## 2019-05-17 RX ADMIN — ENOXAPARIN SODIUM 40 MILLIGRAM(S): 100 INJECTION SUBCUTANEOUS at 01:07

## 2019-05-17 RX ADMIN — Medication 2 MILLIGRAM(S): at 05:40

## 2019-05-17 RX ADMIN — FINASTERIDE 5 MILLIGRAM(S): 5 TABLET, FILM COATED ORAL at 11:36

## 2019-05-17 RX ADMIN — LISINOPRIL 20 MILLIGRAM(S): 2.5 TABLET ORAL at 05:39

## 2019-05-17 RX ADMIN — PANTOPRAZOLE SODIUM 40 MILLIGRAM(S): 20 TABLET, DELAYED RELEASE ORAL at 05:39

## 2019-05-17 RX ADMIN — Medication 25 MILLIGRAM(S): at 13:51

## 2019-05-17 NOTE — DISCHARGE NOTE PROVIDER - NSDCCPCAREPLAN_GEN_ALL_CORE_FT
PRINCIPAL DISCHARGE DIAGNOSIS  Diagnosis: Bandemia  Assessment and Plan of Treatment: -You were noted to have an elevated band level in the ER and admitted to determine the source.  It was likely a viral infection, and trended down.  Please follow up with your PCP on Monday for repeat lab work.      SECONDARY DISCHARGE DIAGNOSES  Diagnosis: Vertigo  Assessment and Plan of Treatment: -Dizziness was likely BPPV but could also be due to dehydration.  Please increase fluid intake and take meclizine as needed (have at home).    Diagnosis: Hyperlipidemia  Assessment and Plan of Treatment: -Continue with atorvastatin.    Diagnosis: Essential hypertension  Assessment and Plan of Treatment: -Continue with hydralazine, amlodipine and lisinopril.    Diagnosis: BPH (benign prostatic hyperplasia)  Assessment and Plan of Treatment: -Continue with doxazosin and finasteride.    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  Otherwise stable. PRINCIPAL DISCHARGE DIAGNOSIS  Diagnosis: Bandemia  Assessment and Plan of Treatment: -You were noted to have an elevated band level in the ER and admitted to determine the source.  It was likely a viral infection, and trended down.  Please follow up with your PCP on Monday for repeat lab work (CBC and BMP).      SECONDARY DISCHARGE DIAGNOSES  Diagnosis: Vertigo  Assessment and Plan of Treatment: -Dizziness was likely BPPV but could also be due to dehydration.  Please increase fluid intake and take meclizine as needed (prescription sent to pharmacy).  Please stay hydrated!    Diagnosis: Hyperlipidemia  Assessment and Plan of Treatment: -Continue with atorvastatin.    Diagnosis: Essential hypertension  Assessment and Plan of Treatment: -Continue with hydralazine, amlodipine and lisinopril.    Diagnosis: BPH (benign prostatic hyperplasia)  Assessment and Plan of Treatment: -Continue with doxazosin and finasteride.    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  Otherwise stable.

## 2019-05-17 NOTE — DISCHARGE NOTE PROVIDER - NSDCFUADDAPPT_GEN_ALL_CORE_FT
Appointment for Blood work at Dr. Salazar's office on 5/20/19 at 9am (for CBC and BMP). Appointment for Blood work at  [facility] Dr. Salazar's office on 5/20/19 at 9am (for CBC and BMP).

## 2019-05-17 NOTE — DISCHARGE NOTE PROVIDER - PROVIDER TOKENS
PROVIDER:[TOKEN:[[id number]]] PROVIDER:[TOKEN:[[id number]]],FREE:[LAST:[Martin],FIRST:[Nadir],PHONE:[phone]],FAX:[(   )    -]]

## 2019-05-17 NOTE — DISCHARGE NOTE PROVIDER - HOSPITAL COURSE
FROM ADMISSION H+P:     HPI:    87yo M w/ PMHx COPE (diagnosed in 2019), CKD, HTN, HLD, BPH, melanoma on RLE, BPV, Prater esophagus, Peripheral neuropathy presented to ED c/o dizziness. Pt was seen last year with a cc of elevated BP and BPV.  He was admitted to the ED treated and symptoms resolved.  Pt reports no acute issues since that time.  He reports that they recently returned home from [place] last evening and travelled via [place].  Pt reports that symptoms began last night.  Pt reports that he developed dizziness which he describes as the sensation that he was unsteady on his feet.  He reports initially he did not think much of it but when he awoke this morning he reports that symptoms appeared worse and so he took a Meclizine with no improvement. Symptoms are worsened with movement. He further reports a diffuse mild HA, nausea, and associated abdominal discomfort.  Pt wife reports that he appears to be breathing heavier then normal but pt denies SOB.  Denies fever, chills, V/D/C, CP, palpitations, ext numbness or weakness. Pt endorse to irregular beat noted on his BP machines occasionally but denies any association with cp, palpitations or dizziness or lightheadedness.  Pt admits to chronic cough but states cough seems a little worse since he was switched to Lisinopril few month from Valsartan but hard to tell since he was also diagnosed with COPD recently.  As per wife with worsening hoarseness of voice.            In the ED, afebrile, remaining of vitals wnl, labs pertinent for bandemia 37%, procalcitonin 0.05, lactate 1.9, BUN 29, Cr 1.4 at baseline, lipase 162, CE negative x 2, UA wnl, CXR unremarkable, EKG showed sinus rhythm with occasional PVCs, nonspecific T wave inversion V5-6. Pt received rocephine IV x 1, 1.5L NS bolus, Meclozine 25mg PO, Solu-medrol IV 125mg x1, Zofran 4mg IVP x 2. (16 May 2019 21:52)        ---    HOSPITAL COURSE:     Patient admitted and evaluated for bandemia, although asymptomatic (afebrile, no WBC count).  Likely viral etiology.  Patient received ceftriaxone x1 in the ED, monitored off abx.  Received solumedrol x1 in the ED, WBC elevated on day after admission likely 2/2 steroids.  CXR negative, procal 0.05, lactate 1.9, CT head negative.  Seen by ID, stable from ID POV to be discharged home.  Patient states dizziness resolved.  Likely caused by dehydration.  All other symptoms resolved, no acute complaints.  Bandemia trended downwards.        Patient seen and evaluated on day of discharge, medically optimized to be discharged home with close follow up with PCP and to have repeat blood work done on Monday by outpatient provider.        ---    CONSULTANTS:     ID (Dr. Duke) FROM ADMISSION H+P:     HPI:    89yo M w/ PMHx COPE (diagnosed in 2019), CKD, HTN, HLD, BPH, melanoma on RLE, BPV, Prater esophagus, Peripheral neuropathy presented to ED c/o dizziness. Pt was seen last year with a cc of elevated BP and BPV.  He was admitted to the ED treated and symptoms resolved.  Pt reports no acute issues since that time.  He reports that they recently returned home from [place] last evening and travelled via [place].  Pt reports that symptoms began last night.  Pt reports that he developed dizziness which he describes as the sensation that he was unsteady on his feet.  He reports initially he did not think much of it but when he awoke this morning he reports that symptoms appeared worse and so he took a Meclizine with no improvement. Symptoms are worsened with movement. He further reports a diffuse mild HA, nausea, and associated abdominal discomfort.  Pt wife reports that he appears to be breathing heavier then normal but pt denies SOB.  Denies fever, chills, V/D/C, CP, palpitations, ext numbness or weakness. Pt endorse to irregular beat noted on his BP machines occasionally but denies any association with cp, palpitations or dizziness or lightheadedness.  Pt admits to chronic cough but states cough seems a little worse since he was switched to Lisinopril few month from Valsartan but hard to tell since he was also diagnosed with COPD recently.  As per wife with worsening hoarseness of voice.            In the ED, afebrile, remaining of vitals wnl, labs pertinent for bandemia 37%, procalcitonin 0.05, lactate 1.9, BUN 29, Cr 1.4 at baseline, lipase 162, CE negative x 2, UA wnl, CXR unremarkable, EKG showed sinus rhythm with occasional PVCs, nonspecific T wave inversion V5-6. Pt received rocephine IV x 1, 1.5L NS bolus, Meclozine 25mg PO, Solu-medrol IV 125mg x1, Zofran 4mg IVP x 2. (16 May 2019 21:52)        HOSPITAL COURSE:     Patient admitted and evaluated for bandemia and weakness  and some dizziness  ,     although asymptomatic (afebrile, no WBC count) possible  Likely viral etiology.  Patient received ceftriaxone x1 in the ED, monitored off abx.  Received solumedrol x1 in the ED, WBC elevated / mild leucocystosis on day after admission likely 2/2 steroids.      CXR negative, procal 0.05, lactate 1.9, CT head negative.      Seen by ID dr estefania mcfadden  stable from ID POV to be discharged home.      Patient states dizziness resolved.  Likely caused by dehydration.  All other symptoms resolved, no acute complaints.  Bandemia trended downwards.    . It could be reactive . Hemodynamically pt remain  is stable. dizziness may be related to vertigo . doubt he has nay underlying infection     as per id dr estefania mcfadden      observe off abx till late afternoon than dc plan     - follow cs results although doubt why they were done as he was afebrile on admission normal WBC , lactate and procalcitonin .    - stable for dc from ID point  as per dr mcfadden.     medically stable         Patient seen and evaluated on day of discharge,  physical exam  5/17/19    Vital Signs Last 24 Hrs    T(C): 36.7 (17 May 2019 04:54), Max: 36.7 (16 May 2019 15:13)    T(F): 98 (17 May 2019 04:54), Max: 98 (16 May 2019 15:13)    HR: 80 (17 May 2019 08:21) (72 - 94)    BP: 133/67 (17 May 2019 04:54) (122/58 - 174/74)    BP(mean): --    RR: 17 (17 May 2019 04:54) (16 - 17)    SpO2: 92% (17 May 2019 04:54) (92% - 97%) 96  ra  GEN no distress ,     HEENT nt/nc , perrla , CVS s1s2 no tachy , CHEST bl air enter present  , CNS aao/3 , no focal deficit  motor /intact 5/5  , GI soft , bs present , EXT no edema, pedal pulse present , SKIN no rash.        medically optimized to be discharged home with close follow up with PCP and to have repeat blood work done on Monday by outpatient provider.

## 2019-05-17 NOTE — DISCHARGE NOTE PROVIDER - CARE PROVIDER_API CALL
Desmond Santana)  Neurology  [address] [place] [address]  Phone: [phone]  Fax: [phone]  Follow Up Time: Desmond Santana)  Neurology  [address] [place] [address]  Phone: [phone]  Fax: [phone]  Follow Up Time:     Nadir Salazar  Phone: [phone]  Fax: (   )    -  Follow Up Time:

## 2019-05-17 NOTE — PROVIDER CONTACT NOTE (OTHER) - ACTION/TREATMENT ORDERED:
Orthostatic BP done and reported to MD.  Pt ambulated with no complaints. Pt to be discharged home
this writer gave above noted lab & test results and disc of MRI ct scan of brain to pt per DR Ortiz.pt d/c to home

## 2019-05-17 NOTE — DISCHARGE NOTE NURSING/CASE MANAGEMENT/SOCIAL WORK - NSDCFUADDAPPT_GEN_ALL_CORE_FT
Appointment for Blood work at  [facility] Dr. Salazar's office on 5/20/19 at 9am (for CBC and BMP).

## 2019-05-17 NOTE — CONSULT NOTE ADULT - SUBJECTIVE AND OBJECTIVE BOX
Infectious Diseases Consult by China Duke MD    Reason for Consult :Bandemia , admitted with weakness and dizzines    HPI:  89yo M w/ PMHx COPE (diagnosed in 2019), CKD, HTN, HLD, BPH, melanoma on RLE, Benign positional vertigo , Prater esophagus, Peripheral neuropathy presented to ED c/o dizziness. Pt was seen last year with a cc of elevated BP and BPV.  He was admitted to the ED treated and symptoms resolved.  Pt reports no acute issues since that time.  He reports that they recently returned home from [place] last evening and [place] via [place] till [place] then drove to [place].  Pt reports that symptoms began last night.  Pt reports that he developed dizziness which he describes as the sensation that he was unsteady on his feet.  He reports initially he did not think much of it but when he awoke this morning he reports that symptoms appeared worse and so he took a Meclizine with no improvement. Symptoms are worsened with movement. He further reports a diffuse mild HA, nausea, and associated abdominal discomfort.  Pt wife reports that he appears to be breathing heavier then normal but pt denies SOB.  Denies fever, chills, V/D/C, CP, palpitations, ext numbness or weakness. Pt endorse to irregular beat noted on his BP machines occasionally but denies any association with cp, palpitations or dizziness or lightheadedness.  Pt admits to chronic cough but states cough seems a little worse since he was switched to Lisinopril few month from Valsartan but hard to tell since he was also diagnosed with COPD recently  As per wife with worsening hoarseness of voice.        In the ED, afebrile, remaining of vitals wnl, labs pertinent for bandemia 37%, procalcitonin 0.05, lactate 1.9, BUN 29, Cr 1.4 at baseline, lipase 162, CE negative x 2, UA wnl, CXR unremarkable, EKG showed sinus rhythm with occasional PVCs, nonspecific T wave inversion V5-6. Pt received Rocephin IV x 1, 1.5L NS bolus, Meclozine 25mg PO, Solu-medrol IV 125mg x1, Zofran 4mg IVP x 2. (16 May 2019 21:52)    He was afebrile on admission and has remains afebrile till now, He is hemodynamically stable . Denies any chest pain, palpitations nausea or vomiting or headache . He was noted to have bandemia in ER that has now resolved     Past Medical & Surgical Hx:  PAST MEDICAL & SURGICAL HISTORY:  Peripheral neuropathy  Prater esophagus  Skin melanoma  Vertigo  BPH (benign prostatic hyperplasia)  Hypertension  Hyperlipidemia  H/O exploratory laparotomy  H/O hernia repair      Social History-- Retired,  lives with family   EtOH: denies  Tobacco: ex smoker quit 30 years ago   Drug Use: denies     Travel History: as above     FAMILY HISTORY:  No pertinent family history in first degree relatives      Allergies    No Known Allergies    Intolerances        Home/ Out patient  Medications :  Home Medications:  aspirin 81 mg oral delayed release tablet: 1 tab(s) orally once a day (16 May 2019 22:24)  doxazosin 2 mg oral tablet: 1 tab(s) orally once a day (16 May 2019 22:24)  finasteride 5 mg oral tablet: 1 tab(s) orally once a day (16 May 2019 22:24)  gabapentin 100 mg oral capsule: 1 cap(s) orally 2 times a day (16 May 2019 22:24)  hydrALAZINE 25 mg oral tablet: 1 tab(s) orally 3 times a day (16 May 2019 22:24)  lisinopril 20 mg oral tablet: 1 tab(s) orally 2 times a day (16 May 2019 22:24)  pantoprazole 40 mg oral delayed release tablet: 1 tab(s) orally once a day (16 May 2019 22:24)  pravastatin 40 mg oral tablet: 1 tab(s) orally once a day (at bedtime) (16 May 2019 22:24)      Current Inpatient Medications :    ANTIBIOTICS:       OTHER RELEVANT MEDICATIONS :  amLODIPine   Tablet 10 milliGRAM(s) Oral daily  aspirin enteric coated 81 milliGRAM(s) Oral daily  atorvastatin 10 milliGRAM(s) Oral at bedtime  doxazosin 2 milliGRAM(s) Oral daily  enoxaparin Injectable 40 milliGRAM(s) SubCutaneous every 24 hours  finasteride 5 milliGRAM(s) Oral daily  gabapentin 100 milliGRAM(s) Oral two times a day  hydrALAZINE 25 milliGRAM(s) Oral three times a day  lisinopril 20 milliGRAM(s) Oral two times a day  pantoprazole    Tablet 40 milliGRAM(s) Oral before breakfast      ROS:  CONSTITUTIONAL:  Negative fever or chills, feels well, good appetite  EYES:  Negative  blurry vision or double vision  CARDIOVASCULAR:  Negative for chest pain or palpitations  RESPIRATORY:  Negative for cough, wheezing, or SOB   GASTROINTESTINAL:  Negative for nausea, vomiting, diarrhea, constipation, or abdominal pain  GENITOURINARY:  Negative frequency, urgency , dysuria or hematuria   NEUROLOGIC:  Positive for  dizziness, Negative for  headache, confusion, lightheadedness  All other systems were reviewed and are negative          I&O's Detail      Physical Exam:  Vital Signs Last 24 Hrs  T(C): 36.7 (17 May 2019 04:54), Max: 36.7 (16 May 2019 11:43)  T(F): 98 (17 May 2019 04:54), Max: 98 (16 May 2019 11:43)  HR: 80 (17 May 2019 08:21) (68 - 94)  BP: 133/67 (17 May 2019 04:54) (122/58 - 177/81)  BP(mean): --  RR: 17 (17 May 2019 04:54) (16 - 17)  SpO2: 92% (17 May 2019 04:54) (92% - 100%)  Height (cm): 162.56 ( @ 11:43)  Weight (kg): 76.7 ( @ 11:43)  BMI (kg/m2): 29 ( @ 11:43)  BSA (m2): 1.82 ( @ 11:43)    General: well developed well nourished, in no acute distress  Eyes: sclera anicteric, pupils equal and reactive to light  ENMT: buccal mucosa moist, pharynx not injected  Neck: supple, trachea midline  Lungs: clear, no wheeze/rhonchi  Cardiovascular: regular rate and rhythm, S1 S2  Abdomen: soft, nontender, no organomegaly present, bowel sounds normal  Neurological:  alert and oriented x3, Cranial Nerves II-XII grossly intact  Skin: no increased ecchymosis/petechiae/purpura  Lymph Nodes: no palpable cervical/supraclavicular lymph nodes enlargements  Extremities: no cyanosis/clubbing/edema    Labs:                  15.7   12.16  )----------(  185       ( 17 May 2019 08:14 )               46.6      142    |  108    |  25     ----------------------------<  120        ( 17 May 2019 08:14 )  4.2     |  25     |  1.50     Ca    8.2        ( 17 May 2019 08:14 )    TPro  7.1    /  Alb  3.5    /  TBili  0.8    /  DBili  x      /  AST  27     /  ALT  30     /  AlkPhos  89     ( 17 May 2019 08:14 )    LIVER FUNCTIONS - ( 17 May 2019 08:14 )  Alb: 3.5 g/dL / Pro: 7.1 g/dL / ALK PHOS: 89 U/L / ALT: 30 U/L / AST: 27 U/L / GGT: x           CARDIAC MARKERS ( 16 May 2019 16:59 )  <.015 ng/mL / x     / x     / x     / x      CARDIAC MARKERS ( 16 May 2019 13:13 )  <.015 ng/mL / x     / x     / x     / x          Urinalysis Basic - ( 16 May 2019 18:49 )    Color: Yellow / Appearance: Clear / S.015 / pH: x  Gluc: x / Ketone: Small  / Bili: Negative / Urobili: Negative   Blood: x / Protein: 25 mg/dL / Nitrite: Negative   Leuk Esterase: Negative / RBC: Negative /HPF / WBC 0-2   Sq Epi: x / Non Sq Epi: Negative / Bacteria: Negative      Lactate, Blood: 1.9 mmol/L (19 @ 18:30)    Procalcitonin, Serum (19 @ 19:59)    Procalcitonin, Serum: 0.05:     RECENT CULTURES:  RVP - pending     RADIOLOGY & ADDITIONAL STUDIES:  Xray Chest 1 View- PORTABLE-Urgent (19 @ 14:36) >  Heart suggest normal size.    The lung fields and pleural surfaces are unremarkable.    The chest is similar to May 27, 2018.    IMPRESSION: Negative chest.    CT Head No Cont (19 @ 12:48) >    Age-appropriate brain volume and density. No adverse change from prior.   No intra-axial or extra-axial hemorrhage edema territorial infarct or   mass effect. Clear sinuses. Cranium intact. Bilateral cataract surgery.    Impression: No acute finding. Stable exam.    Assessment :     89yo M w/ PMHx COPE (diagnosed in 2019), CKD, HTN, HLD, BPH, melanoma on RLE, Benign positional vertigo , Prater esophagus, Peripheral neuropathy presented to ED c/o dizziness. hr just returned from [place] via [place] till [place] and then drove to [place]. He has hx of BPV and motion sickness.  He was given a dose of IV Solumedrol 125 mg for unclear reason. He is noted ot have bandemia but has no clinical foci of infection . It could be reactive . Hemodynamically he is stable. His dizziness may be related to vertigo . I doubt he has nay underlying infection     Plan :   - observe off abx  - follow cs results although doubt why they were done as he was afebrile on admission normal WBC , lactate and procalcitonin   - stable for dc from ID point   - continue with Antivert     case discussed with Dr. Ortiz     Continue with present regime .  Appropriate use of antibiotics and adverse effects reviewed.      I have discussed the above plan of care with patient in detail. He expressed understanding of the treatment plan . Risks, benefits and alternatives discussed in detail. I have asked if he has any questions or concerns and appropriately addressed them to the best of my ability .      > 45 minutes spent in direct patient care reviewing  the notes, lab data/ imaging , discussion with multidisciplinary team. All questions were addressed and answered to the best of my capacity .    Thank you for allowing me to participate in the care of your patient .      China Duke MD  [phone]

## 2019-05-17 NOTE — DISCHARGE NOTE NURSING/CASE MANAGEMENT/SOCIAL WORK - NSDCDPATPORTLINK_GEN_ALL_CORE
You can access the [facility], offered by [facility], by registering with the following website: [email or web address]

## 2019-05-18 LAB
CULTURE RESULTS: SIGNIFICANT CHANGE UP
CULTURE RESULTS: SIGNIFICANT CHANGE UP
SPECIMEN SOURCE: SIGNIFICANT CHANGE UP

## 2019-05-21 LAB
CULTURE RESULTS: SIGNIFICANT CHANGE UP
CULTURE RESULTS: SIGNIFICANT CHANGE UP
SPECIMEN SOURCE: SIGNIFICANT CHANGE UP
SPECIMEN SOURCE: SIGNIFICANT CHANGE UP

## 2019-12-20 ENCOUNTER — NEW PATIENT (OUTPATIENT)
Dept: URBAN - METROPOLITAN AREA CLINIC 26 | Facility: CLINIC | Age: 84
End: 2019-12-20

## 2019-12-20 VITALS
HEART RATE: 76 BPM | HEIGHT: 64 IN | SYSTOLIC BLOOD PRESSURE: 118 MMHG | BODY MASS INDEX: 27.31 KG/M2 | DIASTOLIC BLOOD PRESSURE: 62 MMHG | WEIGHT: 160 LBS

## 2019-12-20 DIAGNOSIS — H53.2: ICD-10-CM

## 2019-12-20 DIAGNOSIS — H35.373: ICD-10-CM

## 2019-12-20 DIAGNOSIS — H43.393: ICD-10-CM

## 2019-12-20 PROCEDURE — 99204 OFFICE O/P NEW MOD 45 MIN: CPT

## 2019-12-20 PROCEDURE — 92250 FUNDUS PHOTOGRAPHY W/I&R: CPT

## 2019-12-20 PROCEDURE — 92235 FLUORESCEIN ANGRPH MLTIFRAME: CPT

## 2019-12-20 ASSESSMENT — VISUAL ACUITY
OS_PH: 20/40-2
OS_SC: 20/50
OD_SC: 20/50-2
OD_PH: 20/40-2

## 2019-12-20 ASSESSMENT — TONOMETRY
OS_IOP_MMHG: 12
OD_IOP_MMHG: 11

## 2020-10-13 NOTE — PROCEDURE NOTE: CLINICAL
PROCEDURE NOTE: SLT #1 OD. Diagnosis: POAG, Moderate. Anesthesia: Topical. Prep: Alphagan 0.15%. Prior to treatment, risks/benefits/alternatives discussed including infection, loss of vision, hemorrhage, cataract, glaucoma, retinal tears or detachment. Lens:  SLT laser lens with goniosol. Power: 1.2mJ. Total applications: 461. Application 474 degrees. Patient tolerated procedure well. There were no complications. Post-op instructions given. Post-op IOP = 7 mmHg. Hardeep Meza

## 2020-12-17 ENCOUNTER — FOLLOW UP (OUTPATIENT)
Dept: URBAN - METROPOLITAN AREA CLINIC 26 | Facility: CLINIC | Age: 85
End: 2020-12-17

## 2020-12-17 VITALS — WEIGHT: 165 LBS | HEIGHT: 64 IN | BODY MASS INDEX: 28.17 KG/M2

## 2020-12-17 DIAGNOSIS — H53.2: ICD-10-CM

## 2020-12-17 DIAGNOSIS — H35.373: ICD-10-CM

## 2020-12-17 DIAGNOSIS — H43.393: ICD-10-CM

## 2020-12-17 PROCEDURE — 92014 COMPRE OPH EXAM EST PT 1/>: CPT

## 2020-12-17 PROCEDURE — 92250 FUNDUS PHOTOGRAPHY W/I&R: CPT

## 2020-12-17 ASSESSMENT — VISUAL ACUITY
OD_SC: 20/70-1
OD_PH: 20/30
OS_SC: 20/30

## 2020-12-17 ASSESSMENT — TONOMETRY
OS_IOP_MMHG: 12
OD_IOP_MMHG: 11

## 2021-04-01 ENCOUNTER — OFFICE VISIT (OUTPATIENT)
Age: 86
End: 2021-04-01

## 2021-04-01 NOTE — PATIENT DISCUSSION
*** 4/2/21 Spoke with pt, advised he did not pass VF.  Offered pt to take test again after treating w/ artificial tears.  Pt understands.  Scheduled for re-test.  Renata.

## 2021-05-01 ENCOUNTER — OFFICE VISIT (OUTPATIENT)
Age: 86
End: 2021-05-01

## 2021-05-28 NOTE — ED ADULT NURSE NOTE - PMH
BPH (benign prostatic hyperplasia)    Hyperlipidemia    Hypertension Pt meets criteria for severe protein-calorie malnutrition in context of chronic disease Pt meets criteria for severe protein-calorie malnutrition in context of chronic disease.  PO intake < 75% nutritional needs > one month

## 2021-09-15 NOTE — PATIENT DISCUSSION
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 Template 2008 Karen Magallanes and Mitchell Leyva is reprinted with the express permission of the authors.  No portion of the Safety Plan Template may be reproduced without the express, written permission.  You can contact the authors at [email or web address] or [email or web address]

## 2021-10-01 ENCOUNTER — OFFICE VISIT (OUTPATIENT)
Age: 86
End: 2021-10-01

## 2021-11-01 ENCOUNTER — OFFICE VISIT (OUTPATIENT)
Age: 86
End: 2021-11-01

## 2021-12-06 ENCOUNTER — OFFICE VISIT (OUTPATIENT)
Dept: URBAN - METROPOLITAN AREA CLINIC 9 | Facility: CLINIC | Age: 86
End: 2021-12-06

## 2022-02-17 NOTE — PATIENT DISCUSSION
Patient achieved a 15% reduction in IOP from pretreatment levels or a plan is in place to achieve this goal. Pt will se glc Dr Dorothy Carcamo up [facility] in June 2022.

## 2022-07-30 ENCOUNTER — TELEPHONE ENCOUNTER (OUTPATIENT)
Age: 87
End: 2022-07-30

## 2022-07-30 RX ORDER — FAMOTIDINE 40 MG/1
1 TABLET, FILM COATED ORAL AT BEDTIME
Qty: 0 | Refills: 2 | OUTPATIENT
Start: 2021-12-06 | End: 2022-01-05

## 2022-07-30 RX ORDER — PANTOPRAZOLE SODIUM 40 MG/1
1 TABLET, DELAYED RELEASE ORAL
Qty: 0 | Refills: 2 | OUTPATIENT
Start: 2021-12-06 | End: 2022-01-05

## 2022-07-31 ENCOUNTER — TELEPHONE ENCOUNTER (OUTPATIENT)
Age: 87
End: 2022-07-31

## 2022-07-31 RX ORDER — FAMOTIDINE 40 MG/1
1 TABLET, FILM COATED ORAL AT BEDTIME
Qty: 0 | Refills: 2 | Status: ACTIVE | COMMUNITY
Start: 2021-12-06

## 2022-07-31 RX ORDER — PANTOPRAZOLE SODIUM 40 MG/1
1 TABLET, DELAYED RELEASE ORAL
Qty: 0 | Refills: 2 | Status: ACTIVE | COMMUNITY
Start: 2021-12-06

## 2022-10-20 NOTE — PATIENT DISCUSSION
Patient achieved a 15% reduction in IOP from pretreatment levels or a plan is in place to achieve this goal. Pt will se glc Dr Kali Hernandez up [facility] in June 2022.

## 2022-10-20 NOTE — PATIENT DISCUSSION
Compound Myopic Astigmatism OU -  discussed findings w/patient-  new spectacle Rx issued-  no CL update-  RTC 1 year or prn; 's Notes: MR 3/31/2021DFE  3/31/2021 Good postoperative appearance.

## 2022-11-21 NOTE — PATIENT DISCUSSION
Patient understands condition, prognosis and need for follow up care. Monitor electrolytes, blood glucose, renal indices, & LFTs.

## 2022-12-07 ENCOUNTER — OFFICE VISIT (OUTPATIENT)
Dept: URBAN - METROPOLITAN AREA CLINIC 9 | Facility: CLINIC | Age: 87
End: 2022-12-07

## 2023-01-03 ENCOUNTER — OFFICE VISIT (OUTPATIENT)
Dept: URBAN - METROPOLITAN AREA CLINIC 9 | Facility: CLINIC | Age: 88
End: 2023-01-03

## 2023-01-25 ENCOUNTER — OFFICE VISIT (OUTPATIENT)
Dept: URBAN - METROPOLITAN AREA CLINIC 9 | Facility: CLINIC | Age: 88
End: 2023-01-25
Payer: MEDICARE

## 2023-01-25 ENCOUNTER — WEB ENCOUNTER (OUTPATIENT)
Dept: URBAN - METROPOLITAN AREA CLINIC 9 | Facility: CLINIC | Age: 88
End: 2023-01-25

## 2023-01-25 VITALS
HEIGHT: 64 IN | SYSTOLIC BLOOD PRESSURE: 124 MMHG | WEIGHT: 164 LBS | DIASTOLIC BLOOD PRESSURE: 82 MMHG | BODY MASS INDEX: 28 KG/M2

## 2023-01-25 DIAGNOSIS — R13.19 OTHER DYSPHAGIA: ICD-10-CM

## 2023-01-25 DIAGNOSIS — K21.9 GASTROESOPHAGEAL REFLUX DISEASE, UNSPECIFIED WHETHER ESOPHAGITIS PRESENT: ICD-10-CM

## 2023-01-25 PROBLEM — 40739000: Status: ACTIVE | Noted: 2023-01-25

## 2023-01-25 PROCEDURE — 99214 OFFICE O/P EST MOD 30 MIN: CPT | Performed by: INTERNAL MEDICINE

## 2023-01-25 RX ORDER — FAMOTIDINE 40 MG/1
1 TABLET, FILM COATED ORAL TWICE A DAY
Qty: 60 | Refills: 3 | OUTPATIENT
Start: 2021-12-06

## 2023-01-25 RX ORDER — GABAPENTIN 100 MG/1
TAKE 1 CAPSULE BY MOUTH FOUR TIMES A DAY CAPSULE ORAL
Qty: 120 EACH | Refills: 2 | Status: ACTIVE | COMMUNITY

## 2023-01-25 RX ORDER — AMLODIPINE BESYLATE 10 MG/1
TAKE 1 TABLET BY MOUTH EVERY DAY TABLET ORAL
Qty: 90 EACH | Refills: 0 | Status: ACTIVE | COMMUNITY

## 2023-01-25 RX ORDER — FAMOTIDINE 40 MG/1
1 TABLET, FILM COATED ORAL AT BEDTIME
Qty: 0 | Refills: 2 | Status: ACTIVE | COMMUNITY
Start: 2021-12-06

## 2023-01-25 RX ORDER — DOXAZOSIN MESYLATE 2 MG/1
TAKE 1 TABLET BY MOUTH EVERYDAY AT BEDTIME TABLET ORAL
Qty: 90 EACH | Refills: 0 | Status: ACTIVE | COMMUNITY

## 2023-01-25 RX ORDER — LEVOTHYROXINE SODIUM 25 UG/1
TABLET ORAL
Qty: 90 TABLET | Status: ACTIVE | COMMUNITY

## 2023-01-25 RX ORDER — HYDROXYUREA 500 MG/1
TAKE 1 TABLET BY MOUTH 2 TIMES PER WEEK CAPSULE ORAL
Qty: 24 EACH | Refills: 1 | Status: ACTIVE | COMMUNITY

## 2023-01-25 RX ORDER — FINASTERIDE 5 MG/1
TAKE 1 TABLET BY MOUTH EVERYDAY AT BEDTIME TABLET, FILM COATED ORAL
Qty: 90 EACH | Refills: 1 | Status: ACTIVE | COMMUNITY

## 2023-01-25 RX ORDER — LISINOPRIL 20 MG/1
TAKE 1 TABLET BY MOUTH EVERY DAY TABLET ORAL
Qty: 90 EACH | Refills: 0 | Status: ACTIVE | COMMUNITY

## 2023-01-25 RX ORDER — PANTOPRAZOLE SODIUM 40 MG/1
1 TABLET, DELAYED RELEASE ORAL
Qty: 0 | Refills: 2 | Status: ON HOLD | COMMUNITY
Start: 2021-12-06

## 2023-01-25 RX ORDER — PRAVASTATIN SODIUM 40 MG/1
TAKE 1 TABLET BY MOUTH EVERY DAY TABLET ORAL
Qty: 90 EACH | Refills: 0 | Status: ACTIVE | COMMUNITY

## 2023-01-25 RX ORDER — HYDRALAZINE HYDROCHLORIDE 25 MG/1
TAKE 1 TABLET BY MOUTH THREE TIMES A DAY TABLET ORAL
Qty: 270 EACH | Refills: 0 | Status: ACTIVE | COMMUNITY

## 2023-01-25 NOTE — HPI-TODAY'S VISIT:
Pt here for evaluation of dysphagia, gerd . pt has had an EGD in the remote past and was noted to have questionable richmond's. . Pt has maintained on PPI and h2 blocker at bedtime with fair control of sx. . Pt now here for evaluation of dysphagia at times. Pt has a remote hx/o of EGD but due to new onset dysphagia I would plan on EGD with dilation with risk factors of HTN and HLD. He is agreeable. Will also cont PPI antacid.  .

## 2023-01-30 ENCOUNTER — OFFICE VISIT (OUTPATIENT)
Dept: URBAN - METROPOLITAN AREA SURGERY CENTER 9 | Facility: SURGERY CENTER | Age: 88
End: 2023-01-30

## 2023-01-31 ENCOUNTER — TELEPHONE ENCOUNTER (OUTPATIENT)
Dept: URBAN - METROPOLITAN AREA CLINIC 9 | Facility: CLINIC | Age: 88
End: 2023-01-31

## 2023-02-01 ENCOUNTER — OFFICE VISIT (OUTPATIENT)
Dept: URBAN - METROPOLITAN AREA SURGERY CENTER 9 | Facility: SURGERY CENTER | Age: 88
End: 2023-02-01

## 2023-02-03 ENCOUNTER — OFFICE VISIT (OUTPATIENT)
Dept: URBAN - METROPOLITAN AREA SURGERY CENTER 9 | Facility: SURGERY CENTER | Age: 88
End: 2023-02-03
Payer: MEDICARE

## 2023-02-03 ENCOUNTER — CLAIMS CREATED FROM THE CLAIM WINDOW (OUTPATIENT)
Dept: URBAN - METROPOLITAN AREA CLINIC 4 | Facility: CLINIC | Age: 88
End: 2023-02-03
Payer: MEDICARE

## 2023-02-03 DIAGNOSIS — K29.70 GASTRITIS, UNSPECIFIED, WITHOUT BLEEDING: ICD-10-CM

## 2023-02-03 DIAGNOSIS — K22.2 ACQUIRED ESOPHAGEAL RING: ICD-10-CM

## 2023-02-03 DIAGNOSIS — R13.10 ABNORMAL DEGLUTITION: ICD-10-CM

## 2023-02-03 DIAGNOSIS — K29.70 CHRONIC ACITVE GASTRITIS (H.PYLORI NEGATIVE): ICD-10-CM

## 2023-02-03 PROCEDURE — 43248 EGD GUIDE WIRE INSERTION: CPT | Performed by: INTERNAL MEDICINE

## 2023-02-03 PROCEDURE — 43239 EGD BIOPSY SINGLE/MULTIPLE: CPT | Performed by: CLINIC/CENTER

## 2023-02-03 PROCEDURE — 43239 EGD BIOPSY SINGLE/MULTIPLE: CPT | Performed by: INTERNAL MEDICINE

## 2023-02-03 PROCEDURE — 88305 TISSUE EXAM BY PATHOLOGIST: CPT | Performed by: PATHOLOGY

## 2023-02-03 PROCEDURE — 88312 SPECIAL STAINS GROUP 1: CPT | Performed by: PATHOLOGY

## 2023-02-03 PROCEDURE — 43248 EGD GUIDE WIRE INSERTION: CPT | Performed by: CLINIC/CENTER

## 2023-02-03 RX ORDER — PRAVASTATIN SODIUM 40 MG/1
TAKE 1 TABLET BY MOUTH EVERY DAY TABLET ORAL
Qty: 90 EACH | Refills: 0 | Status: ACTIVE | COMMUNITY

## 2023-02-03 RX ORDER — LISINOPRIL 20 MG/1
TAKE 1 TABLET BY MOUTH EVERY DAY TABLET ORAL
Qty: 90 EACH | Refills: 0 | Status: ACTIVE | COMMUNITY

## 2023-02-03 RX ORDER — FAMOTIDINE 40 MG/1
1 TABLET, FILM COATED ORAL TWICE A DAY
Qty: 60 | Refills: 3 | Status: ACTIVE | COMMUNITY
Start: 2021-12-06

## 2023-02-03 RX ORDER — HYDROXYUREA 500 MG/1
TAKE 1 TABLET BY MOUTH 2 TIMES PER WEEK CAPSULE ORAL
Qty: 24 EACH | Refills: 1 | Status: ACTIVE | COMMUNITY

## 2023-02-03 RX ORDER — AMLODIPINE BESYLATE 10 MG/1
TAKE 1 TABLET BY MOUTH EVERY DAY TABLET ORAL
Qty: 90 EACH | Refills: 0 | Status: ACTIVE | COMMUNITY

## 2023-02-03 RX ORDER — PANTOPRAZOLE SODIUM 40 MG/1
1 TABLET, DELAYED RELEASE ORAL
Qty: 0 | Refills: 2 | Status: ON HOLD | COMMUNITY
Start: 2021-12-06

## 2023-02-03 RX ORDER — DOXAZOSIN MESYLATE 2 MG/1
TAKE 1 TABLET BY MOUTH EVERYDAY AT BEDTIME TABLET ORAL
Qty: 90 EACH | Refills: 0 | Status: ACTIVE | COMMUNITY

## 2023-02-03 RX ORDER — HYDRALAZINE HYDROCHLORIDE 25 MG/1
TAKE 1 TABLET BY MOUTH THREE TIMES A DAY TABLET ORAL
Qty: 270 EACH | Refills: 0 | Status: ACTIVE | COMMUNITY

## 2023-02-03 RX ORDER — GABAPENTIN 100 MG/1
TAKE 1 CAPSULE BY MOUTH FOUR TIMES A DAY CAPSULE ORAL
Qty: 120 EACH | Refills: 2 | Status: ACTIVE | COMMUNITY

## 2023-02-03 RX ORDER — FINASTERIDE 5 MG/1
TAKE 1 TABLET BY MOUTH EVERYDAY AT BEDTIME TABLET, FILM COATED ORAL
Qty: 90 EACH | Refills: 1 | Status: ACTIVE | COMMUNITY

## 2023-02-03 RX ORDER — LEVOTHYROXINE SODIUM 25 UG/1
TABLET ORAL
Qty: 90 TABLET | Status: ACTIVE | COMMUNITY

## 2023-03-01 ENCOUNTER — OFFICE VISIT (OUTPATIENT)
Dept: URBAN - METROPOLITAN AREA CLINIC 9 | Facility: CLINIC | Age: 88
End: 2023-03-01

## 2023-04-12 ENCOUNTER — OFFICE VISIT (OUTPATIENT)
Dept: URBAN - METROPOLITAN AREA CLINIC 9 | Facility: CLINIC | Age: 88
End: 2023-04-12
Payer: MEDICARE

## 2023-04-12 ENCOUNTER — WEB ENCOUNTER (OUTPATIENT)
Dept: URBAN - METROPOLITAN AREA CLINIC 9 | Facility: CLINIC | Age: 88
End: 2023-04-12

## 2023-04-12 VITALS
BODY MASS INDEX: 28 KG/M2 | SYSTOLIC BLOOD PRESSURE: 122 MMHG | HEIGHT: 64 IN | DIASTOLIC BLOOD PRESSURE: 72 MMHG | WEIGHT: 164 LBS

## 2023-04-12 DIAGNOSIS — K21.9 GASTROESOPHAGEAL REFLUX DISEASE WITHOUT ESOPHAGITIS: ICD-10-CM

## 2023-04-12 DIAGNOSIS — K22.2 SCHATZKI'S RING: ICD-10-CM

## 2023-04-12 PROBLEM — 266435005: Status: ACTIVE | Noted: 2023-04-12

## 2023-04-12 PROBLEM — 235623002: Status: ACTIVE | Noted: 2023-04-12

## 2023-04-12 PROCEDURE — 99213 OFFICE O/P EST LOW 20 MIN: CPT | Performed by: INTERNAL MEDICINE

## 2023-04-12 RX ORDER — PRAVASTATIN SODIUM 40 MG/1
TAKE 1 TABLET BY MOUTH EVERY DAY TABLET ORAL
Qty: 90 EACH | Refills: 0 | Status: ACTIVE | COMMUNITY

## 2023-04-12 RX ORDER — GABAPENTIN 100 MG/1
TAKE 1 CAPSULE BY MOUTH FOUR TIMES A DAY CAPSULE ORAL
Qty: 120 EACH | Refills: 2 | Status: ACTIVE | COMMUNITY

## 2023-04-12 RX ORDER — AMLODIPINE BESYLATE 10 MG/1
TAKE 1 TABLET BY MOUTH EVERY DAY TABLET ORAL
Qty: 90 EACH | Refills: 0 | Status: ACTIVE | COMMUNITY

## 2023-04-12 RX ORDER — FAMOTIDINE 40 MG/1
1 TABLET, FILM COATED ORAL TWICE A DAY
Qty: 60 | Refills: 3 | Status: ACTIVE | COMMUNITY
Start: 2021-12-06

## 2023-04-12 RX ORDER — FAMOTIDINE 40 MG/1
1 TABLET AT BEDTIME TABLET, FILM COATED ORAL TWICE A DAY
OUTPATIENT
Start: 2021-12-06

## 2023-04-12 RX ORDER — PANTOPRAZOLE SODIUM 40 MG/1
1 TABLET, DELAYED RELEASE ORAL
OUTPATIENT
Start: 2021-12-06

## 2023-04-12 RX ORDER — LISINOPRIL 20 MG/1
TAKE 1 TABLET BY MOUTH EVERY DAY TABLET ORAL
Qty: 90 EACH | Refills: 0 | Status: ACTIVE | COMMUNITY

## 2023-04-12 RX ORDER — HYDROXYUREA 500 MG/1
TAKE 1 TABLET BY MOUTH 2 TIMES PER WEEK CAPSULE ORAL
Qty: 24 EACH | Refills: 1 | Status: ACTIVE | COMMUNITY

## 2023-04-12 RX ORDER — LEVOTHYROXINE SODIUM 25 UG/1
TABLET ORAL
Qty: 90 TABLET | Status: ACTIVE | COMMUNITY

## 2023-04-12 RX ORDER — HYDRALAZINE HYDROCHLORIDE 25 MG/1
TAKE 1 TABLET BY MOUTH THREE TIMES A DAY TABLET ORAL
Qty: 270 EACH | Refills: 0 | Status: ACTIVE | COMMUNITY

## 2023-04-12 RX ORDER — FINASTERIDE 5 MG/1
TAKE 1 TABLET BY MOUTH EVERYDAY AT BEDTIME TABLET, FILM COATED ORAL
Qty: 90 EACH | Refills: 1 | Status: ACTIVE | COMMUNITY

## 2023-04-12 RX ORDER — PANTOPRAZOLE SODIUM 40 MG/1
1 TABLET, DELAYED RELEASE ORAL
Qty: 0 | Refills: 2 | Status: ON HOLD | COMMUNITY
Start: 2021-12-06

## 2023-04-12 RX ORDER — DOXAZOSIN MESYLATE 2 MG/1
TAKE 1 TABLET BY MOUTH EVERYDAY AT BEDTIME TABLET ORAL
Qty: 90 EACH | Refills: 0 | Status: ACTIVE | COMMUNITY

## 2023-04-12 NOTE — HPI-TODAY'S VISIT:
Pt here for f/u of GERD and dysphagia. . pt has had an EGD in the remote past and was noted to have questionable richmond's. 2/2023 EGD with schatzki ring, dilated, bx neg for Richmond's . Pt here for f/u. His dysphagia is improved and gerd is controlled. I advised that we cont the monitor, cont pepcid in the evening and protonix int he am. repeat EGD prn.  .

## 2023-04-12 NOTE — PHYSICAL EXAM EYES:
Sclerae : White without injection , Conjuntivae and eyelids appear normal Problem: Gas Exchange - Impaired  Goal: Absence of hypoxia  Outcome: Ongoing  Note: Pt on 2L nasal cannula for desaturation during ambulation. Tolerating well. Oxygenation 90-95%. Problem: Isolation Precautions - Risk of Spread of Infection  Goal: Prevent transmission of infection  Outcome: Ongoing  Note: Pt in droplet plus isolation d/t positive rapid covid test.      Problem: Risk for Fluid Volume Deficit  Goal: Maintain normal heart rhythm  Outcome: Ongoing  Note: Continuous telemetry in place. Normal sinus rhythm.

## 2024-01-24 ENCOUNTER — TELEPHONE ENCOUNTER (OUTPATIENT)
Dept: URBAN - METROPOLITAN AREA CLINIC 9 | Facility: CLINIC | Age: 89
End: 2024-01-24

## 2024-01-30 ENCOUNTER — DASHBOARD ENCOUNTERS (OUTPATIENT)
Age: 89
End: 2024-01-30

## 2024-01-30 ENCOUNTER — OFFICE VISIT (OUTPATIENT)
Dept: URBAN - METROPOLITAN AREA CLINIC 9 | Facility: CLINIC | Age: 89
End: 2024-01-30
Payer: MEDICARE

## 2024-01-30 VITALS
WEIGHT: 167 LBS | HEIGHT: 64 IN | DIASTOLIC BLOOD PRESSURE: 52 MMHG | BODY MASS INDEX: 28.51 KG/M2 | SYSTOLIC BLOOD PRESSURE: 138 MMHG

## 2024-01-30 DIAGNOSIS — K21.9 GASTROESOPHAGEAL REFLUX DISEASE WITHOUT ESOPHAGITIS: ICD-10-CM

## 2024-01-30 DIAGNOSIS — R04.2 HEMOPTYSIS: ICD-10-CM

## 2024-01-30 PROCEDURE — 99214 OFFICE O/P EST MOD 30 MIN: CPT | Performed by: INTERNAL MEDICINE

## 2024-01-30 RX ORDER — LISINOPRIL 20 MG/1
TAKE 1 TABLET BY MOUTH EVERY DAY TABLET ORAL
Qty: 90 EACH | Refills: 0 | Status: ACTIVE | COMMUNITY

## 2024-01-30 RX ORDER — AMLODIPINE BESYLATE 10 MG/1
TAKE 1 TABLET BY MOUTH EVERY DAY TABLET ORAL
Qty: 90 EACH | Refills: 0 | Status: ACTIVE | COMMUNITY

## 2024-01-30 RX ORDER — PANTOPRAZOLE SODIUM 40 MG/1
1 TABLET, DELAYED RELEASE ORAL
OUTPATIENT
Start: 2021-12-06

## 2024-01-30 RX ORDER — DOXAZOSIN MESYLATE 2 MG/1
TAKE 1 TABLET BY MOUTH EVERYDAY AT BEDTIME TABLET ORAL
Qty: 90 EACH | Refills: 0 | Status: ACTIVE | COMMUNITY

## 2024-01-30 RX ORDER — HYDRALAZINE HYDROCHLORIDE 25 MG/1
TAKE 1 TABLET BY MOUTH THREE TIMES A DAY TABLET ORAL
Qty: 270 EACH | Refills: 0 | Status: ACTIVE | COMMUNITY

## 2024-01-30 RX ORDER — FAMOTIDINE 40 MG/1
1 TABLET AT BEDTIME TABLET, FILM COATED ORAL ONCE A DAY
OUTPATIENT
Start: 2021-12-06

## 2024-01-30 RX ORDER — PRAVASTATIN SODIUM 40 MG/1
TAKE 1 TABLET BY MOUTH EVERY DAY TABLET ORAL
Qty: 90 EACH | Refills: 0 | Status: ACTIVE | COMMUNITY

## 2024-01-30 RX ORDER — FAMOTIDINE 40 MG/1
1 TABLET AT BEDTIME TABLET, FILM COATED ORAL ONCE A DAY
Status: ACTIVE | COMMUNITY
Start: 2021-12-06

## 2024-01-30 RX ORDER — RUXOLITINIB 5 MG/1
1 TABLET TABLET ORAL
Status: ACTIVE | COMMUNITY

## 2024-01-30 RX ORDER — PANTOPRAZOLE SODIUM 40 MG/1
1 TABLET, DELAYED RELEASE ORAL
Status: ACTIVE | COMMUNITY
Start: 2021-12-06

## 2024-01-30 RX ORDER — GABAPENTIN 100 MG/1
TAKE 1 CAPSULE BY MOUTH CAPSULE ORAL THREE TIMES A DAY
Refills: 2 | Status: ACTIVE | COMMUNITY

## 2024-01-30 RX ORDER — HYDROXYUREA 500 MG/1
TAKE 1 TABLET BY MOUTH CAPSULE ORAL
Refills: 1 | Status: ACTIVE | COMMUNITY

## 2024-01-30 RX ORDER — FINASTERIDE 5 MG/1
TAKE 1 TABLET BY MOUTH EVERYDAY AT BEDTIME TABLET, FILM COATED ORAL ONCE A DAY
Refills: 1 | Status: ACTIVE | COMMUNITY

## 2024-01-30 RX ORDER — LEVOTHYROXINE SODIUM 25 UG/1
1 TABLET IN THE MORNING ON AN EMPTY STOMACH TABLET ORAL ONCE A DAY
Qty: 90 TABLET | Status: ACTIVE | COMMUNITY

## 2024-01-30 NOTE — HPI-TODAY'S VISIT:
Pt here for f/u of GERD and hematemesis/hemoptysis . pt has had an EGD in the remote past and was noted to have questionable richmond's. 2/2023 EGD with schatzki ring, dilated, bx neg for Richmond's . Pt here for f/u. He is on PPI and pepcid. he has noticed some blood with coughing in his sputum. he had slight rhinorrhea. I advised ENT evaluation. I doubt this is esophageal. Cont PPI and pepcid, refer to Dr. San. RTC 1 year. . .  .

## 2024-01-31 ENCOUNTER — TELEPHONE ENCOUNTER (OUTPATIENT)
Dept: URBAN - METROPOLITAN AREA CLINIC 9 | Facility: CLINIC | Age: 89
End: 2024-01-31

## 2024-02-05 NOTE — PATIENT PROFILE ADULT. - TEACHING/LEARNING CULTURAL CONSIDERATIONS
Patient arrived by EMS due to altered Mental status. Per EMS, family states that patient has been confused x 2 days that has progressively gotten worse. EMS reports that patient is dialysis patient that has missed 2 weeks of treatment. Swelling to upper extremities noticed on arrival    none

## 2025-01-31 ENCOUNTER — OFFICE VISIT (OUTPATIENT)
Dept: URBAN - METROPOLITAN AREA CLINIC 9 | Facility: CLINIC | Age: OVER 89
End: 2025-01-31

## 2025-03-11 ENCOUNTER — OFFICE VISIT (OUTPATIENT)
Dept: URBAN - METROPOLITAN AREA CLINIC 9 | Facility: CLINIC | Age: OVER 89
End: 2025-03-11

## 2025-05-13 NOTE — PATIENT PROFILE ADULT. - NS PRO ABUSE SCREEN SUSPICION NEGLECT YN
Writer no

## 2025-05-30 ENCOUNTER — NON-APPOINTMENT (OUTPATIENT)
Age: 89
End: 2025-05-30

## 2025-05-30 ENCOUNTER — APPOINTMENT (OUTPATIENT)
Dept: OTOLARYNGOLOGY | Facility: CLINIC | Age: 89
End: 2025-05-30
Payer: MEDICARE

## 2025-05-30 VITALS
SYSTOLIC BLOOD PRESSURE: 131 MMHG | BODY MASS INDEX: 26.46 KG/M2 | WEIGHT: 155 LBS | HEART RATE: 80 BPM | HEIGHT: 64 IN | DIASTOLIC BLOOD PRESSURE: 73 MMHG

## 2025-05-30 DIAGNOSIS — Z87.448 PERSONAL HISTORY OF OTHER DISEASES OF URINARY SYSTEM: ICD-10-CM

## 2025-05-30 DIAGNOSIS — I50.9 HEART FAILURE, UNSPECIFIED: ICD-10-CM

## 2025-05-30 DIAGNOSIS — K21.9 GASTRO-ESOPHAGEAL REFLUX DISEASE W/OUT ESOPHAGITIS: ICD-10-CM

## 2025-05-30 DIAGNOSIS — K31.9 DISEASE OF STOMACH AND DUODENUM, UNSPECIFIED: ICD-10-CM

## 2025-05-30 DIAGNOSIS — H61.22 IMPACTED CERUMEN, LEFT EAR: ICD-10-CM

## 2025-05-30 DIAGNOSIS — Z86.79 PERSONAL HISTORY OF OTHER DISEASES OF THE CIRCULATORY SYSTEM: ICD-10-CM

## 2025-05-30 DIAGNOSIS — H90.3 SENSORINEURAL HEARING LOSS, BILATERAL: ICD-10-CM

## 2025-05-30 DIAGNOSIS — Z87.891 PERSONAL HISTORY OF NICOTINE DEPENDENCE: ICD-10-CM

## 2025-05-30 DIAGNOSIS — R09.A2 FOREIGN BODY SENSATION, THROAT: ICD-10-CM

## 2025-05-30 PROCEDURE — 99204 OFFICE O/P NEW MOD 45 MIN: CPT | Mod: 25

## 2025-05-30 PROCEDURE — 92567 TYMPANOMETRY: CPT

## 2025-05-30 PROCEDURE — G0268 REMOVAL OF IMPACTED WAX MD: CPT | Mod: 59

## 2025-05-30 PROCEDURE — 92557 COMPREHENSIVE HEARING TEST: CPT

## 2025-05-30 PROCEDURE — 31575 DIAGNOSTIC LARYNGOSCOPY: CPT

## 2025-05-30 RX ORDER — GABAPENTIN 100 MG
100 TABLET ORAL
Refills: 0 | Status: ACTIVE | COMMUNITY

## 2025-05-30 RX ORDER — RUXOLITINIB 5 MG/1
5 TABLET ORAL
Refills: 0 | Status: ACTIVE | COMMUNITY

## 2025-05-30 RX ORDER — HYDROXYUREA 500 MG/1
500 CAPSULE ORAL
Refills: 0 | Status: ACTIVE | COMMUNITY

## 2025-05-30 RX ORDER — AMLODIPINE BESYLATE 2.5 MG/1
2.5 TABLET ORAL
Refills: 0 | Status: ACTIVE | COMMUNITY

## 2025-05-30 RX ORDER — FLUTICASONE FUROATE, UMECLIDINIUM BROMIDE AND VILANTEROL TRIFENATATE 100; 62.5; 25 UG/1; UG/1; UG/1
100-62.5-25 POWDER RESPIRATORY (INHALATION)
Refills: 0 | Status: ACTIVE | COMMUNITY

## 2025-05-30 RX ORDER — FINASTERIDE 5 MG/1
5 TABLET, FILM COATED ORAL
Refills: 0 | Status: ACTIVE | COMMUNITY

## 2025-05-30 RX ORDER — TAMSULOSIN HYDROCHLORIDE 0.4 MG/1
0.4 CAPSULE ORAL
Refills: 0 | Status: ACTIVE | COMMUNITY

## 2025-05-30 RX ORDER — TORSEMIDE 20 MG/1
20 TABLET ORAL
Refills: 0 | Status: ACTIVE | COMMUNITY

## 2025-05-30 RX ORDER — FAMOTIDINE 40 MG/1
40 TABLET, FILM COATED ORAL
Refills: 0 | Status: ACTIVE | COMMUNITY

## 2025-05-30 RX ORDER — PRAVASTATIN SODIUM 10 MG/1
10 TABLET ORAL
Refills: 0 | Status: ACTIVE | COMMUNITY

## 2025-05-30 RX ORDER — LEVOTHYROXINE SODIUM 0.05 MG/1
50 TABLET ORAL
Refills: 0 | Status: ACTIVE | COMMUNITY

## 2025-06-17 ENCOUNTER — APPOINTMENT (OUTPATIENT)
Dept: DERMATOLOGY | Facility: CLINIC | Age: 89
End: 2025-06-17
Payer: MEDICARE

## 2025-06-17 PROCEDURE — 99202 OFFICE O/P NEW SF 15 MIN: CPT
